# Patient Record
Sex: MALE | Race: WHITE | Employment: FULL TIME | URBAN - METROPOLITAN AREA
[De-identification: names, ages, dates, MRNs, and addresses within clinical notes are randomized per-mention and may not be internally consistent; named-entity substitution may affect disease eponyms.]

---

## 2018-06-18 ENCOUNTER — OFFICE VISIT (OUTPATIENT)
Dept: FAMILY MEDICINE | Facility: CLINIC | Age: 43
End: 2018-06-18
Payer: COMMERCIAL

## 2018-06-18 VITALS
HEART RATE: 79 BPM | OXYGEN SATURATION: 98 % | SYSTOLIC BLOOD PRESSURE: 126 MMHG | RESPIRATION RATE: 15 BRPM | DIASTOLIC BLOOD PRESSURE: 74 MMHG | BODY MASS INDEX: 28.34 KG/M2 | HEIGHT: 66 IN | TEMPERATURE: 98.2 F | WEIGHT: 176.38 LBS

## 2018-06-18 DIAGNOSIS — Z13.6 CARDIOVASCULAR SCREENING; LDL GOAL LESS THAN 160: ICD-10-CM

## 2018-06-18 DIAGNOSIS — Z00.00 ROUTINE GENERAL MEDICAL EXAMINATION AT A HEALTH CARE FACILITY: Primary | ICD-10-CM

## 2018-06-18 PROCEDURE — 99396 PREV VISIT EST AGE 40-64: CPT | Performed by: FAMILY MEDICINE

## 2018-06-18 ASSESSMENT — PAIN SCALES - GENERAL: PAINLEVEL: NO PAIN (0)

## 2018-06-18 NOTE — MR AVS SNAPSHOT
After Visit Summary   6/18/2018    Abdi Mckeon    MRN: [id number]           Patient Information     Date Of Birth          1975        Visit Information        Provider Department      6/18/2018 5:30 PM Varun Salazar MD [facility]        Today's Diagnoses     Routine general medical examination at a health care facility    -  1    CARDIOVASCULAR SCREENING; LDL GOAL LESS THAN 160          Care Instructions      Preventive Health Recommendations  Male Ages 40 to 49    Yearly exam:             See your health care provider every year in order to  o   Review health changes.   o   Discuss preventive care.    o   Review your medicines if your doctor has prescribed any.    You should be tested each year for STDs (sexually transmitted diseases) if you re at risk.     Have a cholesterol test every 5 years.     Have a colonoscopy (test for colon cancer) if someone in your family has had colon cancer or polyps before age 50.     After age 45, have a diabetes test (fasting glucose). If you are at risk for diabetes, you should have this test every 3 years.      Talk with your health care provider about whether or not a prostate cancer screening test (PSA) is right for you.    Shots: Get a flu shot each year. Get a tetanus shot every 10 years.     Nutrition:    Eat at least 5 servings of fruits and vegetables daily.     Eat whole-grain bread, whole-wheat pasta and brown rice instead of white grains and rice.     Talk to your provider about Calcium and Vitamin D.     Lifestyle    Exercise for at least 150 minutes a week (30 minutes a day, 5 days a week). This will help you control your weight and prevent disease.     Limit alcohol to one drink per day.     No smoking.     Wear sunscreen to prevent skin cancer.     See your dentist every six months for an exam and cleaning.              Follow-ups after your visit        Future tests that were ordered for you today     Open Future Orders   "      Priority Expected Expires Ordered    Lipid panel reflex to direct LDL Fasting Routine 2018    **Glucose FUTURE 2mo Routine 2018 10/16/2018 2018    **HIV Antigen Antibody Combo FUTURE anytime Routine 2018            Who to contact     If you have questions or need follow up information about today's clinic visit or your schedule please contact [facility] directly at [phone].  Normal or non-critical lab and imaging results will be communicated to you by [employer], letter or phone within 4 business days after the clinic has received the results. If you do not hear from us within 7 days, please contact the clinic through [employer] or phone. If you have a critical or abnormal lab result, we will notify you by phone as soon as possible.  Submit refill requests through [employer] or call your pharmacy and they will forward the refill request to us. Please allow 3 business days for your refill to be completed.          Additional Information About Your Visit        [employer] Information     [employer] lets you send messages to your doctor, view your test results, renew your prescriptions, schedule appointments and more. To sign up, go to [email or web address] . Click on \"Log in\" on the left side of the screen, which will take you to the Welcome page. Then click on \"Sign up Now\" on the right side of the page.     You will be asked to enter the access code listed below, as well as some personal information. Please follow the directions to create your username and password.     Your access code is: [email or web address]  Expires: 2018  7:05 PM     Your access code will  in 90 days. If you need help or a new code, please call your [place] clinic or [phone].        Care EveryWhere ID     This is your Care EveryWhere ID. This could be used by other organizations to access your [facility] medical records  [id number]        Your Vitals Were     " "Pulse Temperature Respirations Height Pulse Oximetry BMI (Body Mass Index)    79 98.2  F (36.8  C) (Tympanic) 15 5' 6.1\" (1.679 m) 98% 28.38 kg/m2       Blood Pressure from Last 3 Encounters:   06/18/18 126/74   03/11/16 124/86   12/06/14 106/70    Weight from Last 3 Encounters:   06/18/18 176 lb 6 oz (80 kg)   03/11/16 172 lb 6.4 oz (78.2 kg)   12/06/14 164 lb (74.4 kg)               Primary Care Provider Office Phone # Fax #    Varun Salazar [phone] [address] [place] [address]        Equal Access to Services     BERNARD ZHENG : Luiz valleso Solucindaali, waaxda luqadaha, qaybta kaalmada adeegyada, erica leyva. So [facility] [phone].    ATENCIÓN: Si habla español, tiene a newton disposición servicios gratuitos de asistencia lingüística. Llame al [phone].    We comply with applicable federal civil rights laws and [place] laws. We do not discriminate on the basis of race, color, national origin, age, disability, sex, sexual orientation, or gender identity.            Thank you!     Thank you for choosing [facility]  for your care. Our goal is always to provide you with excellent care. Hearing back from our patients is one way we can continue to improve our services. Please take a few minutes to complete the written survey that you may receive in the mail after your visit with us. Thank you!             Your Updated Medication List - Protect others around you: Learn how to safely use, store and throw away your medicines at www.disposemymeds.org.      Notice  As of 6/18/2018  7:46 PM    You have not been prescribed any medications.      "

## 2018-06-18 NOTE — PROGRESS NOTES
SUBJECTIVE:   CC: Abdi Mckeon is an 42 year old male who presents for preventative health visit.     Physical   Annual:     Getting at least 3 servings of Calcium per day::  Yes    Bi-annual eye exam::  Yes    Dental care twice a year::  NO    Sleep apnea or symptoms of sleep apnea::  None    Diet::  Regular (no restrictions)    Frequency of exercise::  None    Taking medications regularly::  Yes    Medication side effects::  None    Additional concerns today::  No                    Today's PHQ-2 Score:   PHQ-2 ( 1999 Pfizer) 6/18/2018   Q1: Little interest or pleasure in doing things 0   Q2: Feeling down, depressed or hopeless 0   PHQ-2 Score 0   Q1: Little interest or pleasure in doing things Not at all   Q2: Feeling down, depressed or hopeless Not at all   PHQ-2 Score 0       Abuse: Current or Past(Physical, Sexual or Emotional)- No  Do you feel safe in your environment - Yes    Social History   Substance Use Topics     Smoking status: Never Smoker     Smokeless tobacco: Never Used     Alcohol use No     Alcohol Use 6/18/2018   If you drink alcohol do you typically have greater than 3 drinks per day OR greater than 7 drinks per week? No       Last PSA: No results found for: PSA    Reviewed orders with patient. Reviewed health maintenance and updated orders accordingly - Yes  BP Readings from Last 3 Encounters:   06/18/18 126/74   03/11/16 124/86   12/06/14 106/70    Wt Readings from Last 3 Encounters:   06/18/18 176 lb 6 oz (80 kg)   03/11/16 172 lb 6.4 oz (78.2 kg)   12/06/14 164 lb (74.4 kg)                  Patient Active Problem List   Diagnosis     CARDIOVASCULAR SCREENING; LDL GOAL LESS THAN 160     No past surgical history on file.    Social History   Substance Use Topics     Smoking status: Never Smoker     Smokeless tobacco: Never Used     Alcohol use Yes      Comment: rare     Family History   Problem Relation Age of Onset     Family History Negative Mother      Family History Negative Father       "Hyperlipidemia Father      CEREBROVASCULAR DISEASE Maternal Grandmother      Prostate Cancer Paternal Grandfather      DIABETES No family hx of      Coronary Artery Disease No family hx of      Hypertension No family hx of          No current outpatient prescriptions on file.       Reviewed and updated as needed this visit by clinical staff         Reviewed and updated as needed this visit by Provider            Review of Systems  CONSTITUTIONAL: NEGATIVE for fever, chills, change in weight  INTEGUMENTARY/SKIN: NEGATIVE for worrisome rashes, moles or lesions  EYES: NEGATIVE for vision changes or irritation  ENT: NEGATIVE for ear, mouth and throat problems  RESP: NEGATIVE for significant cough or SOB  CV: NEGATIVE for chest pain, palpitations or peripheral edema  GI: NEGATIVE for nausea, abdominal pain, heartburn, or change in bowel habits   male: negative for dysuria, hematuria, decreased urinary stream, erectile dysfunction, urethral discharge  MUSCULOSKELETAL: NEGATIVE for significant arthralgias or myalgia  NEURO: NEGATIVE for weakness, dizziness or paresthesias  PSYCHIATRIC: NEGATIVE for changes in mood or affect    OBJECTIVE:   /74  Pulse 79  Temp 98.2  F (36.8  C) (Tympanic)  Resp 15  Ht 5' 6.1\" (1.679 m)  Wt 176 lb 6 oz (80 kg)  SpO2 98%  BMI 28.38 kg/m2    Physical Exam  GENERAL: healthy, alert and no distress  NECK: no adenopathy, no asymmetry, masses, or scars and thyroid normal to palpation  RESP: lungs clear to auscultation - no rales, rhonchi or wheezes  CV: regular rate and rhythm, normal S1 S2, no S3 or S4, no murmur, click or rub, no peripheral edema and peripheral pulses strong  ABDOMEN: soft, nontender, no hepatosplenomegaly, no masses and bowel sounds normal  MS: no gross musculoskeletal defects noted, no edema    ASSESSMENT/PLAN:   1. Routine general medical examination at a health care facility    - **Glucose FUTURE 2mo; Future  - **HIV Antigen Antibody Combo FUTURE anytime; " "Future    2. CARDIOVASCULAR SCREENING; LDL GOAL LESS THAN 160    - Lipid panel reflex to direct LDL Fasting; Future    COUNSELING:   Reviewed preventive health counseling, as reflected in patient instructions       Regular exercise       Healthy diet/nutrition         reports that he has never smoked. He has never used smokeless tobacco.    Estimated body mass index is 28.38 kg/(m^2) as calculated from the following:    Height as of this encounter: 5' 6.1\" (1.679 m).    Weight as of this encounter: 176 lb 6 oz (80 kg).   Weight management plan: Discussed healthy diet and exercise guidelines and patient will follow up in 12 months in clinic to re-evaluate.    Counseling Resources:  ATP IV Guidelines  Pooled Cohorts Equation Calculator  FRAX Risk Assessment  ICSI Preventive Guidelines  Dietary Guidelines for Americans, 2010  USDA's MyPlate  ASA Prophylaxis  Lung CA Screening    Varun Salazar MD, MD  [facility]  Answers for HPI/ROS submitted by the patient on 6/18/2018   PHQ-2 Score: 0    "

## 2018-07-07 DIAGNOSIS — Z00.00 ROUTINE GENERAL MEDICAL EXAMINATION AT A HEALTH CARE FACILITY: ICD-10-CM

## 2018-07-07 DIAGNOSIS — Z13.6 CARDIOVASCULAR SCREENING; LDL GOAL LESS THAN 160: ICD-10-CM

## 2018-07-07 LAB
CHOLEST SERPL-MCNC: 171 MG/DL
GLUCOSE SERPL-MCNC: 95 MG/DL (ref 70–99)
HDLC SERPL-MCNC: 34 MG/DL
LDLC SERPL CALC-MCNC: 99 MG/DL
NONHDLC SERPL-MCNC: 137 MG/DL
TRIGL SERPL-MCNC: 188 MG/DL

## 2018-07-07 PROCEDURE — 82947 ASSAY GLUCOSE BLOOD QUANT: CPT | Performed by: FAMILY MEDICINE

## 2018-07-07 PROCEDURE — 87389 HIV-1 AG W/HIV-1&-2 AB AG IA: CPT | Performed by: FAMILY MEDICINE

## 2018-07-07 PROCEDURE — 36415 COLL VENOUS BLD VENIPUNCTURE: CPT | Performed by: FAMILY MEDICINE

## 2018-07-07 PROCEDURE — 80061 LIPID PANEL: CPT | Performed by: FAMILY MEDICINE

## 2018-07-09 ENCOUNTER — TELEPHONE (OUTPATIENT)
Dept: FAMILY MEDICINE | Facility: CLINIC | Age: 43
End: 2018-07-09

## 2018-07-09 LAB — HIV 1+2 AB+HIV1 P24 AG SERPL QL IA: NONREACTIVE

## 2018-07-09 NOTE — TELEPHONE ENCOUNTER
----- Message from Varun Salazar MD sent at 7/8/2018  9:51 AM CDT -----  Please send this pt. a letter with result values for their home medical file.'  All looks good my friend   Have a great summer and fall hunting season   Doc

## 2018-07-09 NOTE — LETTER
July 9, 2018      Abdi Mckeon  [address]  [place] [facility] [address]        Dear ,    We are writing to inform you of your test results.    All looks good my friend   Have a great summer and fall hunting season   Doc     Resulted Orders   Lipid panel reflex to direct LDL Fasting   Result Value Ref Range    Cholesterol 171 <200 mg/dL    Triglycerides 188 (H) <150 mg/dL      Comment:      Borderline high:  150-199 mg/dl  High:             200-499 mg/dl  Very high:       >499 mg/dl  Fasting specimen      HDL Cholesterol 34 (L) >39 mg/dL    LDL Cholesterol Calculated 99 <100 mg/dL      Comment:      Desirable:       <100 mg/dl    Non HDL Cholesterol 137 (H) <130 mg/dL      Comment:      Above Desirable:  130-159 mg/dl  Borderline high:  160-189 mg/dl  High:             190-219 mg/dl  Very high:       >219 mg/dl     **Glucose FUTURE 2mo   Result Value Ref Range    Glucose 95 70 - 99 mg/dL      Comment:      Fasting specimen       If you have any questions or concerns, please call the clinic at the number listed above.       Sincerely,        Varun Salazar MD, MD

## 2019-11-04 ENCOUNTER — OFFICE VISIT (OUTPATIENT)
Dept: FAMILY MEDICINE | Facility: CLINIC | Age: 44
End: 2019-11-04
Payer: COMMERCIAL

## 2019-11-04 VITALS
RESPIRATION RATE: 14 BRPM | TEMPERATURE: 97 F | HEIGHT: 66 IN | OXYGEN SATURATION: 97 % | DIASTOLIC BLOOD PRESSURE: 72 MMHG | WEIGHT: 172.5 LBS | SYSTOLIC BLOOD PRESSURE: 124 MMHG | HEART RATE: 62 BPM | BODY MASS INDEX: 27.72 KG/M2

## 2019-11-04 DIAGNOSIS — Z00.00 ROUTINE GENERAL MEDICAL EXAMINATION AT A HEALTH CARE FACILITY: Primary | ICD-10-CM

## 2019-11-04 DIAGNOSIS — D22.9 ATYPICAL MOLE: ICD-10-CM

## 2019-11-04 DIAGNOSIS — Z23 NEED FOR VACCINATION: ICD-10-CM

## 2019-11-04 DIAGNOSIS — S06.0X0A CONCUSSION WITHOUT LOSS OF CONSCIOUSNESS, INITIAL ENCOUNTER: ICD-10-CM

## 2019-11-04 PROCEDURE — 90471 IMMUNIZATION ADMIN: CPT | Performed by: FAMILY MEDICINE

## 2019-11-04 PROCEDURE — 99396 PREV VISIT EST AGE 40-64: CPT | Mod: 25 | Performed by: FAMILY MEDICINE

## 2019-11-04 PROCEDURE — 90715 TDAP VACCINE 7 YRS/> IM: CPT | Performed by: FAMILY MEDICINE

## 2019-11-04 PROCEDURE — 88342 IMHCHEM/IMCYTCHM 1ST ANTB: CPT | Mod: TC | Performed by: FAMILY MEDICINE

## 2019-11-04 PROCEDURE — 88305 TISSUE EXAM BY PATHOLOGIST: CPT | Mod: TC | Performed by: FAMILY MEDICINE

## 2019-11-04 PROCEDURE — 11104 PUNCH BX SKIN SINGLE LESION: CPT | Performed by: FAMILY MEDICINE

## 2019-11-04 ASSESSMENT — ENCOUNTER SYMPTOMS
MYALGIAS: 0
FREQUENCY: 0
SORE THROAT: 0
PALPITATIONS: 0
COUGH: 0
FEVER: 0
JOINT SWELLING: 0
ABDOMINAL PAIN: 0
EYE PAIN: 0
CONSTIPATION: 0
HEADACHES: 0
HEMATOCHEZIA: 0
HEARTBURN: 0
NERVOUS/ANXIOUS: 0
DIZZINESS: 1
DYSURIA: 0
WEAKNESS: 0
ARTHRALGIAS: 0
HEMATURIA: 0
DIARRHEA: 0
CHILLS: 0
SHORTNESS OF BREATH: 0
PARESTHESIAS: 0
NAUSEA: 0

## 2019-11-04 ASSESSMENT — MIFFLIN-ST. JEOR: SCORE: 1620.2

## 2019-11-04 ASSESSMENT — PAIN SCALES - GENERAL: PAINLEVEL: NO PAIN (0)

## 2019-11-04 NOTE — PROGRESS NOTES
SUBJECTIVE:   CC: Abdi Mckeon is an 43 year old male who presents for preventative health visit.     Healthy Habits:     Getting at least 3 servings of Calcium per day:  Yes    Bi-annual eye exam:  Yes    Dental care twice a year:  Yes    Sleep apnea or symptoms of sleep apnea:  None    Diet:  Regular (no restrictions)    Frequency of exercise:  None    Taking medications regularly:  Yes    Medication side effects:  None    PHQ-2 Total Score: 0    Additional concerns today:  No              Today's PHQ-2 Score:   PHQ-2 ( 1999 Pfizer) 11/4/2019   Q1: Little interest or pleasure in doing things 0   Q2: Feeling down, depressed or hopeless 0   PHQ-2 Score 0   Q1: Little interest or pleasure in doing things Not at all   Q2: Feeling down, depressed or hopeless Not at all   PHQ-2 Score 0       Abuse: Current or Past(Physical, Sexual or Emotional)- No  Do you feel safe in your environment? Yes        Social History     Tobacco Use     Smoking status: Never Smoker     Smokeless tobacco: Never Used   Substance Use Topics     Alcohol use: Yes     Comment: rare         Alcohol Use 11/4/2019   Prescreen: >3 drinks/day or >7 drinks/week? No       Last PSA: No results found for: PSA    Reviewed orders with patient. Reviewed health maintenance and updated orders accordingly - Yes  BP Readings from Last 3 Encounters:   11/04/19 124/72   06/18/18 126/74   03/11/16 124/86    Wt Readings from Last 3 Encounters:   11/04/19 78.2 kg (172 lb 8 oz)   06/18/18 80 kg (176 lb 6 oz)   03/11/16 78.2 kg (172 lb 6.4 oz)                  Patient Active Problem List   Diagnosis     CARDIOVASCULAR SCREENING; LDL GOAL LESS THAN 160     History reviewed. No pertinent surgical history.    Social History     Tobacco Use     Smoking status: Never Smoker     Smokeless tobacco: Never Used   Substance Use Topics     Alcohol use: Yes     Comment: rare     Family History   Problem Relation Age of Onset     Family History Negative Mother      Family History  Negative Father      Hyperlipidemia Father      Cerebrovascular Disease Maternal Grandmother      Prostate Cancer Paternal Grandfather      Diabetes No family hx of      Coronary Artery Disease No family hx of      Hypertension No family hx of            Reviewed and updated as needed this visit by clinical staff  Tobacco  Allergies  Meds  Med Hx  Surg Hx  Fam Hx  Soc Hx        Reviewed and updated as needed this visit by Provider            Review of Systems   Constitutional: Negative for chills and fever.   HENT: Negative for congestion, ear pain, hearing loss and sore throat.    Eyes: Negative for pain and visual disturbance.   Respiratory: Negative for cough and shortness of breath.    Cardiovascular: Negative for chest pain, palpitations and peripheral edema.   Gastrointestinal: Negative for abdominal pain, constipation, diarrhea, heartburn, hematochezia and nausea.   Genitourinary: Negative for discharge, dysuria, frequency, genital sores, hematuria, impotence and urgency.   Musculoskeletal: Negative for arthralgias, joint swelling and myalgias.   Skin: Negative for rash.   Neurological: Positive for dizziness. Negative for weakness, headaches and paresthesias.   Psychiatric/Behavioral: Negative for mood changes. The patient is not nervous/anxious.      CONSTITUTIONAL: NEGATIVE for fever, chills, change in weight  INTEGUMENTARY/SKIN: Left side of his forehead which is changed gotten larger over the past 3 weeks he would like it removed and sent him to make sure it is not skin cancer.  No other complaints concerns at this time.  EYES: NEGATIVE for vision changes or irritation  ENT: NEGATIVE for ear, mouth and throat problems  RESP: NEGATIVE for significant cough or SOB  CV: NEGATIVE for chest pain, palpitations or peripheral edema  GI: NEGATIVE for nausea, abdominal pain, heartburn, or change in bowel habits   male: negative for dysuria, hematuria, decreased urinary stream, erectile dysfunction,  "urethral discharge  MUSCULOSKELETAL: NEGATIVE for significant arthralgias or myalgia  NEURO: Patient states over 2 weeks ago he fell off a ladder about 3 to 4 feet and struck his head when he landed.  States he did not feel bad first few days other than whiplash neck pain.  He states that over the past week though he is noticed some vertigo type symptoms when he rolls over in bed quickly-hit the snooze on his alarm.  Or he turns his head quickly is a little vertiginous symptom.  He denies any headache.  No other neuro complaints at this time I did have a long discussion with him about postconcussive syndrome.  PSYCHIATRIC: NEGATIVE for changes in mood or affect    OBJECTIVE:   /72   Pulse 62   Temp 97  F (36.1  C) (Tympanic)   Resp 14   Ht 1.676 m (5' 6\")   Wt 78.2 kg (172 lb 8 oz)   SpO2 97%   BMI 27.84 kg/m      Physical Exam  GENERAL: healthy, alert and no distress  EYES: Eyes grossly normal to inspection, PERRL and conjunctivae and sclerae normal  HENT: ear canals and TM's normal, nose and mouth without ulcers or lesions  NECK: no adenopathy, no asymmetry, masses, or scars and thyroid normal to palpation  RESP: lungs clear to auscultation - no rales, rhonchi or wheezes  CV: regular rate and rhythm, normal S1 S2, no S3 or S4, no murmur, click or rub, no peripheral edema and peripheral pulses strong  ABDOMEN: soft, nontender, no hepatosplenomegaly, no masses and bowel sounds normal  MS: no gross musculoskeletal defects noted, no edema  SKIN: Patient has a 0.2 x 0.2 cm mole over the left side of his forehead.  NEURO: Normal strength and tone, sensory exam grossly normal, mentation intact, cranial nerves 2-12 intact, DTR's normal and symmetric, gait normal and Romberg normal  PSYCH: mentation appears normal, affect normal/bright    Informed consent was obtained the area over the mole was prepped with alcohol anesthetized with 1% lidocaine with epinephrine and the mole was removed with a #2 punch.  The " "base was hyfrecated.  Sterile dressing was applied.    ASSESSMENT/PLAN:   1. Routine general medical examination at a health care facility      2. Need for vaccination    - TDAP VACCINE (ADACEL) [38885.002]  - 1st  Administration  [33201]  - SCREENING QUESTIONS FOR ADULT IMMUNIZATIONS    3. Atypical mole    - Dermatological path order and indications    4.  Concussion without loss of consciousness  I did tell the patient postconcussive symptoms can last for long time he should have taken at least a week off work once the injury happened letter is brain rest but now a little too late for that.  I will follow him closely and his neurologic progress.  Can take meclizine for the vertiginous symptoms but these should resolve over the next 2 to 3 weeks if they persist I would like to get him evaluated by a neurologist.  COUNSELING:   Reviewed preventive health counseling, as reflected in patient instructions       Regular exercise       Healthy diet/nutrition    Estimated body mass index is 27.84 kg/m  as calculated from the following:    Height as of this encounter: 1.676 m (5' 6\").    Weight as of this encounter: 78.2 kg (172 lb 8 oz).     Weight management plan: Discussed healthy diet and exercise guidelines     reports that he has never smoked. He has never used smokeless tobacco.      Counseling Resources:  ATP IV Guidelines  Pooled Cohorts Equation Calculator  FRAX Risk Assessment  ICSI Preventive Guidelines  Dietary Guidelines for Americans, 2010  USDA's MyPlate  ASA Prophylaxis  Lung CA Screening    Varun Salazar MD, MD  [facility]  "

## 2019-11-04 NOTE — LETTER
"November 11, 2019      Abdi Bryant  [address]  [place] [facility] [address]        Dear ,    We are writing to inform you of your test results.    The lesion I took off your forehead was a seborrheic keratosis which is just a normal skin lesion.  It will never turn into cancer or anything that should be concerning, continue to use sunscreen and avoid sunburn.     Good luck deer hunting, like to get a bird hunting next fall.    Resulted Orders   Dermatological path order and indications   Result Value Ref Range    Copath Report       Patient Name: ABDI BRYANT  MR#: [id number]  Specimen #: [id number]  Collected: 11/4/2019  Received: 11/5/2019  Reported: 11/7/2019 19:57  Ordering Phy(s): GRADY MATA    For improved result formatting, select 'View Enhanced Report Format' under   Linked Documents section.    SPECIMEN(S):  Punch biopsy, left forehead    FINAL DIAGNOSIS:  Skin, left forehead:  - Seborrheic keratosis, pigmented - (see description)    I have personally reviewed all specimens and/or slides, including the   listed special stains, and used them  with my medical judgement to determine or confirm the final diagnosis.    Electronically signed out by:    Roger Meeks M.D., PhD, [facility]    CLINICAL HISTORY:  The patient is a 43-year-old male    GROSS:  The specimen is received in formalin with proper patient identification,   labeled \"left forehead\".  The  specimen consists of a 0.1 cm in diameter skin punch biopsy excised to a   depth of 0.1 cm.  The entire skin  surface is covered by a 0.1 x 0. 1 x 0.1 cm brown raised lesion.  The   resection margin is inked blue.  The  specimen is entirely submitted in cassette A 1. (Dictated by: Oral Graves   11/5/2019 01:42 PM)    MICROSCOPIC:  The specimen exhibits compact orthokeratosis, papillomatous epidermal   hyperplasia with horn cyst formation and  increased keratinocyte pigmentation. Melan A shows a normal number and   distribution of basal " melanocytes.    The technical component of this testing was completed at the [facility], with the professional component performed   at the [facility], [address] [place] [id number] ([phone])    CPT Codes:  A: 64519-KR2.T, 35826-XO5.P, 31086-TKPKW    COLLECTION SITE:  Client: [facility]  Location: [facility] (P)           If you have any questions or concerns, please call the clinic at the number listed above.       Sincerely,        Varun Salazar MD, MD

## 2019-11-07 LAB — COPATH REPORT: NORMAL

## 2020-10-05 ENCOUNTER — OFFICE VISIT (OUTPATIENT)
Dept: PODIATRY | Facility: CLINIC | Age: 45
End: 2020-10-05
Payer: COMMERCIAL

## 2020-10-05 ENCOUNTER — ANCILLARY PROCEDURE (OUTPATIENT)
Dept: GENERAL RADIOLOGY | Facility: CLINIC | Age: 45
End: 2020-10-05
Attending: PODIATRIST
Payer: COMMERCIAL

## 2020-10-05 VITALS
HEIGHT: 66 IN | SYSTOLIC BLOOD PRESSURE: 118 MMHG | BODY MASS INDEX: 27.97 KG/M2 | DIASTOLIC BLOOD PRESSURE: 72 MMHG | WEIGHT: 174 LBS

## 2020-10-05 DIAGNOSIS — M72.2 PLANTAR FASCIITIS: ICD-10-CM

## 2020-10-05 DIAGNOSIS — M72.2 PLANTAR FASCIITIS: Primary | ICD-10-CM

## 2020-10-05 PROCEDURE — 73630 X-RAY EXAM OF FOOT: CPT | Mod: RT | Performed by: RADIOLOGY

## 2020-10-05 PROCEDURE — 99204 OFFICE O/P NEW MOD 45 MIN: CPT | Performed by: PODIATRIST

## 2020-10-05 RX ORDER — DICLOFENAC SODIUM 75 MG/1
75 TABLET, DELAYED RELEASE ORAL 2 TIMES DAILY
Qty: 60 TABLET | Refills: 1 | Status: SHIPPED | OUTPATIENT
Start: 2020-10-05 | End: 2021-11-26

## 2020-10-05 ASSESSMENT — MIFFLIN-ST. JEOR: SCORE: 1625.82

## 2020-10-05 ASSESSMENT — PAIN SCALES - GENERAL: PAINLEVEL: MILD PAIN (2)

## 2020-10-05 NOTE — PROGRESS NOTES
HPI:  Abdi Mckeon is a 44 year old male who is seen in consultation at the request of self    Pt presents for eval of:   (Onset, Location, L/R, Character, Treatments, Injury if yes)    XR Left and Right foot 10/5/2020     Onset July 2020, plantar Left and Right heel pain > Right. No injury noted. Presents today with work boots. 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    Weight management plan: Patient was referred to their PCP to discuss a diet and exercise plan.       ROS: 10 point ROS neg other than the symptoms noted above in the HPI.    Patient Active Problem List   Diagnosis     CARDIOVASCULAR SCREENING; LDL GOAL LESS THAN 160       PAST MEDICAL HISTORY: History reviewed. No pertinent past medical history.  PAST SURGICAL HISTORY: History reviewed. No pertinent surgical history.  MEDICATIONS:   Current Outpatient Medications:      diclofenac (VOLTAREN) 75 MG EC tablet, Take 1 tablet (75 mg) by mouth 2 times daily, Disp: 60 tablet, Rfl: 1  ALLERGIES:    Allergies   Allergen Reactions     No Known Drug Allergies      SOCIAL HISTORY:   Social History     Socioeconomic History     Marital status:      Spouse name: Not on file     Number of children: Not on file     Years of education: Not on file     Highest education level: Not on file   Occupational History     Not on file   Social Needs     Financial resource strain: Not on file     Food insecurity     Worry: Not on file     Inability: Not on file     Transportation needs     Medical: Not on file     Non-medical: Not on file   Tobacco Use     Smoking status: Never Smoker     Smokeless tobacco: Never Used   Substance and Sexual Activity     Alcohol use: Yes     Comment: rare     Drug use: No     Sexual activity: Yes     Partners: Female     Birth control/protection: Pill   Lifestyle     Physical activity     Days per week: Not on file     Minutes per session: Not on file     Stress: Not on file   Relationships     Social  "connections     Talks on phone: Not on file     Gets together: Not on file     Attends Gnosticist service: Not on file     Active member of club or organization: Not on file     Attends meetings of clubs or organizations: Not on file     Relationship status: Not on file     Intimate partner violence     Fear of current or ex partner: Not on file     Emotionally abused: Not on file     Physically abused: Not on file     Forced sexual activity: Not on file   Other Topics Concern     Parent/sibling w/ CABG, MI or angioplasty before 65F 55M? Not Asked   Social History Narrative     Not on file     FAMILY HISTORY:   Family History   Problem Relation Age of Onset     Family History Negative Mother      Family History Negative Father      Hyperlipidemia Father      Cerebrovascular Disease Maternal Grandmother      Prostate Cancer Paternal Grandfather      Diabetes No family hx of      Coronary Artery Disease No family hx of      Hypertension No family hx of        EXAM:Vitals: /72 (BP Location: Left arm, Patient Position: Sitting, Cuff Size: Adult Regular)   Ht 1.682 m (5' 6.24\")   Wt 78.9 kg (174 lb)   BMI 27.88 kg/m    BMI= Body mass index is 27.88 kg/m .    General appearance: Patient is alert and fully cooperative with history & exam.  No sign of distress is noted during the visit.     Psychiatric: Affect is pleasant & appropriate.  Patient appears motivated to improve health.     Respiratory: Breathing is regular & unlabored while sitting.     HEENT: Hearing is intact to spoken word.  Speech is clear.  No gross evidence of visual impairment that would impact ambulation.     Vascular: DP & PT 2/4 & regular bilaterally.  No significant edema, rubor or varicosities noted.  CFT and skin temperature is normal to both lower extremities.       Neurologic: Lower extremity sensation is intact to light touch.  No evidence of weakness in the lower extremities.  No evidence of neuropathy and negative tinel sign.   "   Dermatologic: Skin is intact to both lower extremities without significant lesions, rash or abrasion.  Normal texture turgor and tone. No paronychia or evidence of soft tissue infection is noted.    Musculoskeletal: Patient is ambulatory without assistive device or brace. Pain is noted with firm palpation along the medial band of the plantar fascia right foot most notably at the origination upon the calcaneus not through the arch.  No pain with compression of the calcaneus medial to lateral or with palpation of the achilles, peroneal or posterior tibial tendons.  Slightly more than 0  of ankle joint dorsiflexion without crepitus or pain throughout the ankle, subtalar or midtarsal joints.  No pain or limitations throughout manual muscle strength testing plus 5/5 to all four quadrants bilateral.  No palpable edema noted.      Radiographs:  Osteophyte noted about the plantar calcaneus consistent with plantar fasciitis.     ASSESSMENT:       ICD-10-CM    1. Plantar fasciitis  M72.2 XR Foot Bilateral G/E 3 Views     ORTHOTICS REFERRAL     diclofenac (VOLTAREN) 75 MG EC tablet       PLAN:  Reviewed patient's chart in [facility] and discussed etiology and treatment options.      Treatments:  10/5/2020  Obtained and interpreted radiographs.   Discontinue barefoot walking or unsupported walking in shoes without shank.  Dispensed written instructions to obtain appropriate shoe gear and/or OTC inserts.  Dispensed anterior night splint to use all night every night.  Prescription oral Voltaren for a short course. Discussed risks.  Prescription for custom molded orthotics 10/5/2020  Follow up in 4-5 weeks       Harris Smith DPM

## 2020-10-05 NOTE — PATIENT INSTRUCTIONS
Reliable shoe stores: To maximize your experience and provide the best possible fit.  Be sure to show them your foot concerns and tell them Dr. Smith sent you.      Stores listed in bold have only athletic shoes, and stores that are not bold are mostly casual or variety of shoes    [employer]  [address] [place] [address]  [phone]    [employer] [place]  [address] [place] [address]  [phone]     [employer]  [address] [place] [address]  [phone]    [employer]  [address] [facility]  [facility] [address]  [phone]    [employer]  [address] [place], [place] [address] [phone]    [employer]  [address] [place] [address]  [phone]                         [employer] Locations:     [address] [place] [address]   [phone]     [address] [place] [address] [address] [place] [address]   [phone]     [address] [place] [address]   [phone]     [address] [facility] [place] [address]   [phone]     [address] [facility] [place] [address]   [phone]     [address] [place] [address]   [phone]     [address] [facility] [address]   [place] [address]   [phone]     [address] [facility]   [place] [address]   [phone]             How to find reasonable shoes          The correct width    Correct Fitting    Correct Length      Foot Distortion    Posture Distortion                          Torsional Rigidity      Grasp behind the heel and underneath the foot and twist      Bad    Excessive torsion/twist in midfoot     Less torsion/twist in midfoot is better                   Heel Counter Rigidity      Grasp just above   midsole and squeeze      Bad    Soft heel counter      Good    Rigid Heel Counter      Flexion Rigidity      Grasp shoe and bend from forefoot to rearfoot                  PLANTAR FASCIITIS  The  plantar fascia  is a  thick fibrous layer of tissue that covers the bones on the bottom of your foot. It supports the foot bones in an arched position.  Plantar fasciitis  is a painful inflammation of the plantar fascia due to overuse. This can develop gradually or suddenly. It usually affects one foot at a time but can affect both feet. Heel pain can be sharp and feel like a knife sticking in the bottom of your foot. Pain may occur after exercising, long distance jogging, stair climbing, long periods of standing, or after getting up from a seated position.  Risk factors include arthritis, diabetes, obesity or recent weight gain, flat-foot, high arch, wearing high heels or loose shoes or shoes with poor arch support.  Sudden changes in activity or shoe gear may contribute to symptoms.  Foot pain from this condition is usually worse in the morning and improves with walking. By the end of the day there may be a dull aching. Treatment requires improved support of feet, short-term rest and controlling inflammation. It may take up to nine months before all symptoms go away with the measures described below.  A steroid injection into the foot, or surgery may be needed if this is becomes long standing or severe.  HOME CARE  1. If you are overweight, lose weight to promote healing.  2. Choose supportive shoes (stiff through the shank) with good arch support and shock absorbency. Replace athletic shoes when they become worn out. Don t walk or run barefoot.  3. Shoe inserts are an important part of treatment. You can buy off-the-shelf shoe inserts inexpensively such as [employer].  The best ones are custom molded to your foot with a prescription.  4. Night splints keep the plantar fascia gently stretched while you sleep and will eliminate morning pain. Wear it ALL NIGHT EVERY NIGHT, or any time you sit for a long time.  5. Reduce by 10% or more the activities that stress the feet: jogging, prolonged standing or walking, high impact sports,  etc.  6. Stretch your feet. Gently flex your ankle by leaning into a wall or counter or drop your heel from a step.  Stretch two minutes of every hour you are awake.  7. Icing or massage may help heel pain. Apply an ice pack or frozen water or Coke bottle to the heel for 10-20 minutes as a preventive or after an acute flare of symptoms. You may repeat this as needed.   Follow up with your Doctor in 3 weeks as instructed.

## 2020-10-05 NOTE — LETTER
10/5/2020         RE: Abdi Mckeon  [address]  [facility] [address]        Dear Colleague,    Thank you for referring your patient, Abdi Mckeon, to the [facility]. Please see a copy of my visit note below.    HPI:  Abdi Mckeon is a 44 year old male who is seen in consultation at the request of self    Pt presents for eval of:   (Onset, Location, L/R, Character, Treatments, Injury if yes)    XR Left and Right foot 10/5/2020     Onset July 2020, plantar Left and Right heel pain > Right. No injury noted. Presents today with work boots. 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    Weight management plan: Patient was referred to their PCP to discuss a diet and exercise plan.       ROS: 10 point ROS neg other than the symptoms noted above in the HPI.    Patient Active Problem List   Diagnosis     CARDIOVASCULAR SCREENING; LDL GOAL LESS THAN 160       PAST MEDICAL HISTORY: History reviewed. No pertinent past medical history.  PAST SURGICAL HISTORY: History reviewed. No pertinent surgical history.  MEDICATIONS:   Current Outpatient Medications:      diclofenac (VOLTAREN) 75 MG EC tablet, Take 1 tablet (75 mg) by mouth 2 times daily, Disp: 60 tablet, Rfl: 1  ALLERGIES:    Allergies   Allergen Reactions     No Known Drug Allergies      SOCIAL HISTORY:   Social History     Socioeconomic History     Marital status:      Spouse name: Not on file     Number of children: Not on file     Years of education: Not on file     Highest education level: Not on file   Occupational History     Not on file   Social Needs     Financial resource strain: Not on file     Food insecurity     Worry: Not on file     Inability: Not on file     Transportation needs     Medical: Not on file     Non-medical: Not on file   Tobacco Use     Smoking status: Never Smoker     Smokeless tobacco: Never Used   Substance and Sexual Activity     Alcohol use: Yes     Comment: rare      "Drug use: No     Sexual activity: Yes     Partners: Female     Birth control/protection: Pill   Lifestyle     Physical activity     Days per week: Not on file     Minutes per session: Not on file     Stress: Not on file   Relationships     Social connections     Talks on phone: Not on file     Gets together: Not on file     Attends Pentecostal service: Not on file     Active member of club or organization: Not on file     Attends meetings of clubs or organizations: Not on file     Relationship status: Not on file     Intimate partner violence     Fear of current or ex partner: Not on file     Emotionally abused: Not on file     Physically abused: Not on file     Forced sexual activity: Not on file   Other Topics Concern     Parent/sibling w/ CABG, MI or angioplasty before 65F 55M? Not Asked   Social History Narrative     Not on file     FAMILY HISTORY:   Family History   Problem Relation Age of Onset     Family History Negative Mother      Family History Negative Father      Hyperlipidemia Father      Cerebrovascular Disease Maternal Grandmother      Prostate Cancer Paternal Grandfather      Diabetes No family hx of      Coronary Artery Disease No family hx of      Hypertension No family hx of        EXAM:Vitals: /72 (BP Location: Left arm, Patient Position: Sitting, Cuff Size: Adult Regular)   Ht 1.682 m (5' 6.24\")   Wt 78.9 kg (174 lb)   BMI 27.88 kg/m    BMI= Body mass index is 27.88 kg/m .    General appearance: Patient is alert and fully cooperative with history & exam.  No sign of distress is noted during the visit.     Psychiatric: Affect is pleasant & appropriate.  Patient appears motivated to improve health.     Respiratory: Breathing is regular & unlabored while sitting.     HEENT: Hearing is intact to spoken word.  Speech is clear.  No gross evidence of visual impairment that would impact ambulation.     Vascular: DP & PT 2/4 & regular bilaterally.  No significant edema, rubor or varicosities " noted.  CFT and skin temperature is normal to both lower extremities.       Neurologic: Lower extremity sensation is intact to light touch.  No evidence of weakness in the lower extremities.  No evidence of neuropathy and negative tinel sign.     Dermatologic: Skin is intact to both lower extremities without significant lesions, rash or abrasion.  Normal texture turgor and tone. No paronychia or evidence of soft tissue infection is noted.    Musculoskeletal: Patient is ambulatory without assistive device or brace. Pain is noted with firm palpation along the medial band of the plantar fascia right foot most notably at the origination upon the calcaneus not through the arch.  No pain with compression of the calcaneus medial to lateral or with palpation of the achilles, peroneal or posterior tibial tendons.  Slightly more than 0  of ankle joint dorsiflexion without crepitus or pain throughout the ankle, subtalar or midtarsal joints.  No pain or limitations throughout manual muscle strength testing plus 5/5 to all four quadrants bilateral.  No palpable edema noted.      Radiographs:  Osteophyte noted about the plantar calcaneus consistent with plantar fasciitis.     ASSESSMENT:       ICD-10-CM    1. Plantar fasciitis  M72.2 XR Foot Bilateral G/E 3 Views     ORTHOTICS REFERRAL     diclofenac (VOLTAREN) 75 MG EC tablet       PLAN:  Reviewed patient's chart in [facility] and discussed etiology and treatment options.      Treatments:  10/5/2020  Obtained and interpreted radiographs.   Discontinue barefoot walking or unsupported walking in shoes without shank.  Dispensed written instructions to obtain appropriate shoe gear and/or OTC inserts.  Dispensed anterior night splint to use all night every night.  Prescription oral Voltaren for a short course. Discussed risks.  Prescription for custom molded orthotics 10/5/2020  Follow up in 4-5 weeks       Harris Smith DPM        Again, thank you for allowing me to participate in the care  of your patient.        Sincerely,        Harris Smith DPM

## 2021-11-26 ENCOUNTER — OFFICE VISIT (OUTPATIENT)
Dept: FAMILY MEDICINE | Facility: CLINIC | Age: 46
End: 2021-11-26
Payer: COMMERCIAL

## 2021-11-26 VITALS
OXYGEN SATURATION: 97 % | DIASTOLIC BLOOD PRESSURE: 80 MMHG | BODY MASS INDEX: 26.83 KG/M2 | SYSTOLIC BLOOD PRESSURE: 112 MMHG | RESPIRATION RATE: 16 BRPM | TEMPERATURE: 97.3 F | WEIGHT: 177 LBS | HEIGHT: 68 IN | HEART RATE: 72 BPM

## 2021-11-26 DIAGNOSIS — Z00.00 ROUTINE GENERAL MEDICAL EXAMINATION AT A HEALTH CARE FACILITY: Primary | ICD-10-CM

## 2021-11-26 LAB
ALBUMIN SERPL-MCNC: 3.9 G/DL (ref 3.4–5)
ALP SERPL-CCNC: 69 U/L (ref 40–150)
ALT SERPL W P-5'-P-CCNC: 26 U/L (ref 0–70)
ANION GAP SERPL CALCULATED.3IONS-SCNC: 3 MMOL/L (ref 3–14)
AST SERPL W P-5'-P-CCNC: 10 U/L (ref 0–45)
BASOPHILS # BLD AUTO: 0 10E3/UL (ref 0–0.2)
BASOPHILS NFR BLD AUTO: 1 %
BILIRUB SERPL-MCNC: 0.6 MG/DL (ref 0.2–1.3)
BUN SERPL-MCNC: 10 MG/DL (ref 7–30)
CALCIUM SERPL-MCNC: 8.6 MG/DL (ref 8.5–10.1)
CHLORIDE BLD-SCNC: 111 MMOL/L (ref 94–109)
CO2 SERPL-SCNC: 26 MMOL/L (ref 20–32)
CREAT SERPL-MCNC: 0.88 MG/DL (ref 0.66–1.25)
EOSINOPHIL # BLD AUTO: 0.3 10E3/UL (ref 0–0.7)
EOSINOPHIL NFR BLD AUTO: 5 %
ERYTHROCYTE [DISTWIDTH] IN BLOOD BY AUTOMATED COUNT: 12.2 % (ref 10–15)
GFR SERPL CREATININE-BSD FRML MDRD: >90 ML/MIN/1.73M2
GLUCOSE BLD-MCNC: 105 MG/DL (ref 70–99)
HCT VFR BLD AUTO: 49.6 % (ref 40–53)
HCV AB SERPL QL IA: NONREACTIVE
HGB BLD-MCNC: 17.6 G/DL (ref 13.3–17.7)
IMM GRANULOCYTES # BLD: 0.1 10E3/UL
IMM GRANULOCYTES NFR BLD: 1 %
LYMPHOCYTES # BLD AUTO: 1.6 10E3/UL (ref 0.8–5.3)
LYMPHOCYTES NFR BLD AUTO: 25 %
MCH RBC QN AUTO: 29.8 PG (ref 26.5–33)
MCHC RBC AUTO-ENTMCNC: 35.5 G/DL (ref 31.5–36.5)
MCV RBC AUTO: 84 FL (ref 78–100)
MONOCYTES # BLD AUTO: 0.4 10E3/UL (ref 0–1.3)
MONOCYTES NFR BLD AUTO: 6 %
NEUTROPHILS # BLD AUTO: 4 10E3/UL (ref 1.6–8.3)
NEUTROPHILS NFR BLD AUTO: 62 %
NRBC # BLD AUTO: 0 10E3/UL
NRBC BLD AUTO-RTO: 0 /100
PLATELET # BLD AUTO: 208 10E3/UL (ref 150–450)
POTASSIUM BLD-SCNC: 4.3 MMOL/L (ref 3.4–5.3)
PROT SERPL-MCNC: 6.8 G/DL (ref 6.8–8.8)
RBC # BLD AUTO: 5.91 10E6/UL (ref 4.4–5.9)
SODIUM SERPL-SCNC: 140 MMOL/L (ref 133–144)
WBC # BLD AUTO: 6.4 10E3/UL (ref 4–11)

## 2021-11-26 PROCEDURE — 86769 SARS-COV-2 COVID-19 ANTIBODY: CPT | Mod: 90 | Performed by: PHYSICIAN ASSISTANT

## 2021-11-26 PROCEDURE — 99396 PREV VISIT EST AGE 40-64: CPT | Performed by: PHYSICIAN ASSISTANT

## 2021-11-26 PROCEDURE — 36415 COLL VENOUS BLD VENIPUNCTURE: CPT | Performed by: PHYSICIAN ASSISTANT

## 2021-11-26 PROCEDURE — 85025 COMPLETE CBC W/AUTO DIFF WBC: CPT | Performed by: PHYSICIAN ASSISTANT

## 2021-11-26 PROCEDURE — 99000 SPECIMEN HANDLING OFFICE-LAB: CPT | Performed by: PHYSICIAN ASSISTANT

## 2021-11-26 PROCEDURE — 86803 HEPATITIS C AB TEST: CPT | Performed by: PHYSICIAN ASSISTANT

## 2021-11-26 PROCEDURE — 80053 COMPREHEN METABOLIC PANEL: CPT | Performed by: PHYSICIAN ASSISTANT

## 2021-11-26 ASSESSMENT — ENCOUNTER SYMPTOMS
MYALGIAS: 0
PARESTHESIAS: 0
ARTHRALGIAS: 0
FREQUENCY: 0
JOINT SWELLING: 0
CHILLS: 0
FEVER: 0
ABDOMINAL PAIN: 0
NERVOUS/ANXIOUS: 0
DYSURIA: 0
SORE THROAT: 0
PALPITATIONS: 0
HEARTBURN: 0
WEAKNESS: 0
NAUSEA: 0
DIZZINESS: 0
COUGH: 0
HEMATOCHEZIA: 0
HEMATURIA: 0
SHORTNESS OF BREATH: 0
CONSTIPATION: 0
HEADACHES: 0
DIARRHEA: 0
EYE PAIN: 0

## 2021-11-26 ASSESSMENT — PAIN SCALES - GENERAL: PAINLEVEL: NO PAIN (0)

## 2021-11-26 ASSESSMENT — MIFFLIN-ST. JEOR: SCORE: 1657.37

## 2021-11-26 NOTE — LETTER
November 30, 2021      Abdi Mckeon  [address] [facility] [address]        Dear ,    We are writing to inform you of your test results.      The results of your lab work returned with no evidence of covid antibodies. The hepatitis C screen returned negative. The metabolic panel, which evaluates your kidney and liver function, electrolytes and blood sugar, returned grossly normal. In addition, the cell counts did not show any evidence of anemia, infection or other abnormality. Overall, great labs. Please continue to maintain a healthy diet low in salts/sugars/fatty&greasy foods with regular servings of fruits and vegetables and try to get in 30 minutes of aerobic exercise 5 or more days each week as able.     Recommend next annual checkup in 1 year.     ySlvester Joseph PA-C       Resulted Orders   Comprehensive metabolic panel (BMP + Alb, Alk Phos, ALT, AST, Total. Bili, TP)   Result Value Ref Range    Sodium 140 133 - 144 mmol/L    Potassium 4.3 3.4 - 5.3 mmol/L    Chloride 111 (H) 94 - 109 mmol/L    Carbon Dioxide (CO2) 26 20 - 32 mmol/L    Anion Gap 3 3 - 14 mmol/L    Urea Nitrogen 10 7 - 30 mg/dL    Creatinine 0.88 0.66 - 1.25 mg/dL    Calcium 8.6 8.5 - 10.1 mg/dL    Glucose 105 (H) 70 - 99 mg/dL    Alkaline Phosphatase 69 40 - 150 U/L    AST 10 0 - 45 U/L    ALT 26 0 - 70 U/L    Protein Total 6.8 6.8 - 8.8 g/dL    Albumin 3.9 3.4 - 5.0 g/dL    Bilirubin Total 0.6 0.2 - 1.3 mg/dL    GFR Estimate >90 >60 mL/min/1.73m2      Comment:      As of July 11, 2021, eGFR is calculated by the CKD-EPI creatinine equation, without race adjustment. eGFR can be influenced by muscle mass, exercise, and diet. The reported eGFR is an estimation only and is only applicable if the renal function is stable.   Hepatitis C Screen Reflex to HCV RNA Quant and Genotype   Result Value Ref Range    Hepatitis C Antibody Nonreactive Nonreactive    Narrative    Assay performance characteristics have not been established for  newborns, infants, and children.   COVID-19 Ben RBD Samira & Titer Reflex   Result Value Ref Range    SARS-CoV-2 Ben Ab, Interp, S Negative Negative      Comment:      No antibodies to SARS-CoV-2 spike glycoprotein detected.   Negative results may occur in serum collected too soon   following infection or vaccination, in immunosuppressed   patients or in patients with mild or asymptomatic   infection. This test does not rule out active and/or   recent COVID-19 infection or vaccination. Follow up   testing with a molecular test for SARS-CoV-2 is   recommended in symptomatic patients.    SARS-CoV-2 Ben Ab, Quant, S <0.40 <0.80 U/mL      Comment:         -------------------ADDITIONAL INFORMATION-------------------  Testing was performed using the Roche Elecsys Anti-SARS-  CoV-2 S Reagent assay from [employer], which has  received Emergency Use Authorization (EUA) by the U.S.  Food and Drug Administration.      Fact sheets for this Emergency Use Authorization (EUA)  assay can be found at the following links:  For Healthcare Providers:  https://www.fda.gov/media/396477/download  For Patients:  https://www.fda.gov/media/389790/download    Patient's Race White     Patient's Ethnicity Not        Comment:         Test Performed by:  [facility]  [address] [place] [address]  : Vince Mccoy M.D. Ph.D.; CLIA# [id number]   CBC with platelets and differential   Result Value Ref Range    WBC Count 6.4 4.0 - 11.0 10e3/uL    RBC Count 5.91 (H) 4.40 - 5.90 10e6/uL    Hemoglobin 17.6 13.3 - 17.7 g/dL    Hematocrit 49.6 40.0 - 53.0 %    MCV 84 78 - 100 fL    MCH 29.8 26.5 - 33.0 pg    MCHC 35.5 31.5 - 36.5 g/dL    RDW 12.2 10.0 - 15.0 %    Platelet Count 208 150 - 450 10e3/uL    % Neutrophils 62 %    % Lymphocytes 25 %    % Monocytes 6 %    % Eosinophils 5 %    % Basophils 1 %    % Immature Granulocytes 1 %    NRBCs per 100 WBC 0 <1 /100     Absolute Neutrophils 4.0 1.6 - 8.3 10e3/uL    Absolute Lymphocytes 1.6 0.8 - 5.3 10e3/uL    Absolute Monocytes 0.4 0.0 - 1.3 10e3/uL    Absolute Eosinophils 0.3 0.0 - 0.7 10e3/uL    Absolute Basophils 0.0 0.0 - 0.2 10e3/uL    Absolute Immature Granulocytes 0.1 (H) <=0.0 10e3/uL    Absolute NRBCs 0.0 10e3/uL       If you have any questions or concerns, please call the clinic at the number listed above.

## 2021-11-26 NOTE — PROGRESS NOTES
SUBJECTIVE:   CC: Abdi Mckeon is an 46 year old male who presents for preventative health visit.     Patient has been advised of split billing requirements and indicates understanding: Yes  Healthy Habits:     Getting at least 3 servings of Calcium per day:  Yes    Bi-annual eye exam:  NO    Dental care twice a year:  Yes    Sleep apnea or symptoms of sleep apnea:  None    Diet:  Regular (no restrictions)    Frequency of exercise:  None    Taking medications regularly:  Yes    Medication side effects:  None    PHQ-2 Total Score: 0    Additional concerns today:  No    Patient is a 46 year old male who presents today for annual checkup. He was last seen by PCP in 2019 and since then he says that he has remained in good general health. 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 He said that he took his daughter out hunting this past deer season, they saw a pineda that unfortunately ran off before his daughter could get a shot. He also spent some time in [place] hunting pheasants. Plans to continue pheasant hunting in [place] this winter as well as ice fishing. He does not have any health concerns at this time. Would like to be tested for covid antibodies as he said that he believes he had been infected in the past.     Today's PHQ-2 Score:   PHQ-2 ( 1999 Pfizer) 11/26/2021   Q1: Little interest or pleasure in doing things 0   Q2: Feeling down, depressed or hopeless 0   PHQ-2 Score 0   PHQ-2 Total Score (12-17 Years)- Positive if 3 or more points; Administer PHQ-A if positive -   Q1: Little interest or pleasure in doing things Not at all   Q2: Feeling down, depressed or hopeless Not at all   PHQ-2 Score 0       Abuse: Current or Past(Physical, Sexual or Emotional)- No  Do you feel safe in your environment? Yes    Have you ever done Advance Care Planning? (For example, a Health Directive, POLST, or a discussion with a medical provider or your  "loved ones about your wishes): Yes, patient states has an Advance Care Planning document and will bring a copy to the clinic.    Social History     Tobacco Use     Smoking status: Never Smoker     Smokeless tobacco: Never Used   Substance Use Topics     Alcohol use: Yes     Comment: rare     If you drink alcohol do you typically have >3 drinks per day or >7 drinks per week? No    Alcohol Use 11/26/2021   Prescreen: >3 drinks/day or >7 drinks/week? No   No flowsheet data found.    Last PSA: No results found for: PSA    Reviewed orders with patient. Reviewed health maintenance and updated orders accordingly - Yes  Lab work is in process    Reviewed and updated as needed this visit by clinical staff  Tobacco  Allergies  Meds   Med Hx  Surg Hx  Fam Hx  Soc Hx       Reviewed and updated as needed this visit by Provider                   Review of Systems   Constitutional: Negative for chills and fever.   HENT: Negative for congestion, ear pain, hearing loss and sore throat.    Eyes: Negative for pain and visual disturbance.   Respiratory: Negative for cough and shortness of breath.    Cardiovascular: Negative for chest pain, palpitations and peripheral edema.   Gastrointestinal: Negative for abdominal pain, constipation, diarrhea, heartburn, hematochezia and nausea.   Genitourinary: Negative for dysuria, frequency, genital sores, hematuria, impotence, penile discharge and urgency.   Musculoskeletal: Negative for arthralgias, joint swelling and myalgias.   Skin: Negative for rash.   Neurological: Negative for dizziness, weakness, headaches and paresthesias.   Psychiatric/Behavioral: Negative for mood changes. The patient is not nervous/anxious.        OBJECTIVE:   /80   Pulse 72   Temp 97.3  F (36.3  C) (Temporal)   Resp 16   Ht 1.727 m (5' 8\")   Wt 80.3 kg (177 lb)   SpO2 97%   BMI 26.91 kg/m      Physical Exam  GENERAL: healthy, alert and no distress  EYES: Eyes grossly normal to inspection, PERRL and " "conjunctivae and sclerae normal  HENT: ear canals and TM's normal, nose and mouth without ulcers or lesions  NECK: no adenopathy, no asymmetry, masses, or scars and thyroid normal to palpation  RESP: lungs clear to auscultation - no rales, rhonchi or wheezes  CV: regular rate and rhythm, normal S1 S2, no S3 or S4, no murmur, click or rub, no peripheral edema and peripheral pulses strong  ABDOMEN: soft, nontender, no hepatosplenomegaly, no masses and bowel sounds normal  MS: no gross musculoskeletal defects noted, no edema  NEURO: Normal strength and tone, mentation intact and speech normal  PSYCH: mentation appears normal, affect normal/bright    Diagnostic Test Results:  In process    ASSESSMENT/PLAN:       ICD-10-CM    1. Routine general medical examination at a health care facility  Z00.00 Comprehensive metabolic panel (BMP + Alb, Alk Phos, ALT, AST, Total. Bili, TP)     CBC with platelets and differential     Hepatitis C Screen Reflex to HCV RNA Quant and Genotype     COVID-19 Ben RBD Samira & Titer Reflex       Patient has been advised of split billing requirements and indicates understanding: Yes  COUNSELING:   Reviewed preventive health counseling, as reflected in patient instructions       Regular exercise       Healthy diet/nutrition       Prostate cancer screening    Estimated body mass index is 26.91 kg/m  as calculated from the following:    Height as of this encounter: 1.727 m (5' 8\").    Weight as of this encounter: 80.3 kg (177 lb).     Weight management plan: Discussed healthy diet and exercise guidelines    He reports that he has never smoked. He has never used smokeless tobacco.      Counseling Resources:  ATP IV Guidelines  Pooled Cohorts Equation Calculator  FRAX Risk Assessment  ICSI Preventive Guidelines  Dietary Guidelines for Americans, 2010  USDA's MyPlate  ASA Prophylaxis  Lung CA Screening    IS[facility]  "

## 2021-11-29 LAB
SARS-COV-2 AB SERPL IA-ACNC: <0.4 U/ML
SARS-COV-2 AB SERPL QL IA: NEGATIVE

## 2021-12-18 ENCOUNTER — HEALTH MAINTENANCE LETTER (OUTPATIENT)
Age: 46
End: 2021-12-18

## 2022-09-20 ENCOUNTER — OFFICE VISIT (OUTPATIENT)
Dept: PODIATRY | Facility: CLINIC | Age: 47
End: 2022-09-20
Payer: COMMERCIAL

## 2022-09-20 VITALS
HEIGHT: 67 IN | DIASTOLIC BLOOD PRESSURE: 70 MMHG | BODY MASS INDEX: 27.47 KG/M2 | SYSTOLIC BLOOD PRESSURE: 108 MMHG | WEIGHT: 175 LBS

## 2022-09-20 DIAGNOSIS — M72.2 PLANTAR FASCIITIS: Primary | ICD-10-CM

## 2022-09-20 PROCEDURE — 99213 OFFICE O/P EST LOW 20 MIN: CPT | Performed by: PODIATRIST

## 2022-09-20 ASSESSMENT — PAIN SCALES - GENERAL: PAINLEVEL: NO PAIN (0)

## 2022-09-20 NOTE — PROGRESS NOTES
Chief Complaint   Patient presents with     RECHECK     Under control with orthotics - B/L plantar fasciitis, onset July 2020; LOV 10/5/2020     Consult     Request order for new orthotics       HPI:  Patient has a history of Planter fasciitis left and right heel pain presents with work boots on today.  He notes that as long as he wears his orthotics he has no pain but he is not able to wear his orthotics and all of his shoes and he would like to have multiple pairs of orthotics.  There is starting to breakdown.  When he does not wear his orthotics he has pain burning aching stabbing guarding with palpation and walking.  Requesting multiple pairs of orthotics.  Massage.    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    ROS: 10 point ROS neg other than the symptoms noted above in the HPI.    Patient Active Problem List   Diagnosis     CARDIOVASCULAR SCREENING; LDL GOAL LESS THAN 160       PAST MEDICAL HISTORY: History reviewed. No pertinent past medical history.  PAST SURGICAL HISTORY: History reviewed. No pertinent surgical history.  MEDICATIONS: No current outpatient medications on file.  ALLERGIES:    Allergies   Allergen Reactions     No Known Drug Allergies      SOCIAL HISTORY:   Social History     Socioeconomic History     Marital status:      Spouse name: Not on file     Number of children: Not on file     Years of education: Not on file     Highest education level: Not on file   Occupational History     Not on file   Tobacco Use     Smoking status: Never Smoker     Smokeless tobacco: Never Used   Vaping Use     Vaping Use: Never used   Substance and Sexual Activity     Alcohol use: Yes     Comment: rare     Drug use: No     Sexual activity: Yes     Partners: Female     Birth control/protection: Pill   Other Topics Concern     Parent/sibling w/ CABG, MI or angioplasty before 65F 55M? Not Asked   Social History Narrative     Not on file     Social Determinants of Health     Financial Resource Strain: Not on file  "  Food Insecurity: Not on file   Transportation Needs: Not on file   Physical Activity: Not on file   Stress: Not on file   Social Connections: Not on file   Intimate Partner Violence: Not on file   Housing Stability: Not on file     FAMILY HISTORY:   Family History   Problem Relation Age of Onset     Family History Negative Mother      Family History Negative Father      Hyperlipidemia Father      Cerebrovascular Disease Maternal Grandmother      Prostate Cancer Paternal Grandfather      Diabetes No family hx of      Coronary Artery Disease No family hx of      Hypertension No family hx of        EXAM:Vitals: /70 (BP Location: Left arm, Patient Position: Sitting, Cuff Size: Adult Regular)   Ht 1.69 m (5' 6.54\")   Wt 79.4 kg (175 lb)   BMI 27.79 kg/m    BMI= Body mass index is 27.79 kg/m .    General appearance: Patient is alert and fully cooperative with history & exam.  No sign of distress is noted during the visit.     Psychiatric: Affect is pleasant & appropriate.  Patient appears motivated to improve health.     Respiratory: Breathing is regular & unlabored while sitting.     HEENT: Hearing is intact to spoken word.  Speech is clear.  No gross evidence of visual impairment that would impact ambulation.     Vascular: DP & PT 2/4 & regular bilaterally.  No significant edema, rubor or varicosities noted.  CFT and skin temperature is normal to both lower extremities.       Neurologic: Lower extremity sensation is intact to light touch.  No evidence of weakness in the lower extremities.  No evidence of neuropathy and negative tinel sign.     Dermatologic: Skin is intact to both lower extremities without significant lesions, rash or abrasion.  Normal texture turgor and tone. No paronychia or evidence of soft tissue infection is noted.    Musculoskeletal: Patient is ambulatory without assistive device or brace.  Minimal discomfort is noted with firm palpation along the medial band of the plantar fascia right " foot most notably at the origination upon the calcaneus not through the arch.  No pain with compression of the calcaneus medial to lateral or with palpation of the achilles, peroneal or posterior tibial tendons.  Slightly more than 0  of ankle joint dorsiflexion without crepitus or pain throughout the ankle, subtalar or midtarsal joints.  No pain or limitations throughout manual muscle strength testing plus 5/5 to all four quadrants bilateral.  No palpable edema noted.      Radiographs:  Osteophyte noted about the plantar calcaneus consistent with plantar fasciitis.     ASSESSMENT:       ICD-10-CM    1. Plantar fasciitis  M72.2 Orthotics and Prosthetics DME Orthotic; Foot Orthotics; Bilateral       PLAN:  Reviewed patient's chart in [facility] and discussed etiology and treatment options.      Treatments:  10/5/2020  Obtained and interpreted radiographs.   Discontinue barefoot walking or unsupported walking in shoes without shank.  Dispensed written instructions to obtain appropriate shoe gear and/or OTC inserts.  Dispensed anterior night splint to use all night every night.  Prescription oral Voltaren for a short course. Discussed risks.  Prescription for custom molded orthotics 10/5/2020  Follow up in 4-5 weeks     9/20/2022   Order for orthotics, multiple pairs  Discussed options  Discussed appropriate shoe gear etiology and treatment options  All questions were answered follow-up as needed      Harris Smith DPM

## 2022-09-20 NOTE — LETTER
9/20/2022         RE: Abdi Mckeon  [address]  [place] [address]        Dear Colleague,    Thank you for referring your patient, Abdi Mckeon, to the [facility]. Please see a copy of my visit note below.    Chief Complaint   Patient presents with     RECHECK     Under control with orthotics - B/L plantar fasciitis, onset July 2020; LOV 10/5/2020     Consult     Request order for new orthotics       HPI:  Patient has a history of Planter fasciitis left and right heel pain presents with work boots on today.  He notes that as long as he wears his orthotics he has no pain but he is not able to wear his orthotics and all of his shoes and he would like to have multiple pairs of orthotics.  There is starting to breakdown.  When he does not wear his orthotics he has pain burning aching stabbing guarding with palpation and walking.  Requesting multiple pairs of orthotics.  Massage.    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    ROS: 10 point ROS neg other than the symptoms noted above in the HPI.    Patient Active Problem List   Diagnosis     CARDIOVASCULAR SCREENING; LDL GOAL LESS THAN 160       PAST MEDICAL HISTORY: History reviewed. No pertinent past medical history.  PAST SURGICAL HISTORY: History reviewed. No pertinent surgical history.  MEDICATIONS: No current outpatient medications on file.  ALLERGIES:    Allergies   Allergen Reactions     No Known Drug Allergies      SOCIAL HISTORY:   Social History     Socioeconomic History     Marital status:      Spouse name: Not on file     Number of children: Not on file     Years of education: Not on file     Highest education level: Not on file   Occupational History     Not on file   Tobacco Use     Smoking status: Never Smoker     Smokeless tobacco: Never Used   Vaping Use     Vaping Use: Never used   Substance and Sexual Activity     Alcohol use: Yes     Comment: rare     Drug use: No     Sexual activity: Yes     Partners: Female      "Birth control/protection: Pill   Other Topics Concern     Parent/sibling w/ CABG, MI or angioplasty before 65F 55M? Not Asked   Social History Narrative     Not on file     Social Determinants of Health     Financial Resource Strain: Not on file   Food Insecurity: Not on file   Transportation Needs: Not on file   Physical Activity: Not on file   Stress: Not on file   Social Connections: Not on file   Intimate Partner Violence: Not on file   Housing Stability: Not on file     FAMILY HISTORY:   Family History   Problem Relation Age of Onset     Family History Negative Mother      Family History Negative Father      Hyperlipidemia Father      Cerebrovascular Disease Maternal Grandmother      Prostate Cancer Paternal Grandfather      Diabetes No family hx of      Coronary Artery Disease No family hx of      Hypertension No family hx of        EXAM:Vitals: /70 (BP Location: Left arm, Patient Position: Sitting, Cuff Size: Adult Regular)   Ht 1.69 m (5' 6.54\")   Wt 79.4 kg (175 lb)   BMI 27.79 kg/m    BMI= Body mass index is 27.79 kg/m .    General appearance: Patient is alert and fully cooperative with history & exam.  No sign of distress is noted during the visit.     Psychiatric: Affect is pleasant & appropriate.  Patient appears motivated to improve health.     Respiratory: Breathing is regular & unlabored while sitting.     HEENT: Hearing is intact to spoken word.  Speech is clear.  No gross evidence of visual impairment that would impact ambulation.     Vascular: DP & PT 2/4 & regular bilaterally.  No significant edema, rubor or varicosities noted.  CFT and skin temperature is normal to both lower extremities.       Neurologic: Lower extremity sensation is intact to light touch.  No evidence of weakness in the lower extremities.  No evidence of neuropathy and negative tinel sign.     Dermatologic: Skin is intact to both lower extremities without significant lesions, rash or abrasion.  Normal texture turgor " and tone. No paronychia or evidence of soft tissue infection is noted.    Musculoskeletal: Patient is ambulatory without assistive device or brace.  Minimal discomfort is noted with firm palpation along the medial band of the plantar fascia right foot most notably at the origination upon the calcaneus not through the arch.  No pain with compression of the calcaneus medial to lateral or with palpation of the achilles, peroneal or posterior tibial tendons.  Slightly more than 0  of ankle joint dorsiflexion without crepitus or pain throughout the ankle, subtalar or midtarsal joints.  No pain or limitations throughout manual muscle strength testing plus 5/5 to all four quadrants bilateral.  No palpable edema noted.      Radiographs:  Osteophyte noted about the plantar calcaneus consistent with plantar fasciitis.     ASSESSMENT:       ICD-10-CM    1. Plantar fasciitis  M72.2 Orthotics and Prosthetics DME Orthotic; Foot Orthotics; Bilateral       PLAN:  Reviewed [facility] chart in [facility] and discussed etiology and treatment options.      Treatments:  10/5/2020  Obtained and interpreted radiographs.   Discontinue barefoot walking or unsupported walking in shoes without shank.  Dispensed written instructions to obtain appropriate shoe gear and/or OTC inserts.  Dispensed anterior night splint to use all night every night.  Prescription oral Voltaren for a short course. Discussed risks.  Prescription for custom molded orthotics 10/5/2020  Follow up in 4-5 weeks     9/20/2022   Order for orthotics, multiple pairs  Discussed options  Discussed appropriate shoe gear etiology and treatment options  All questions were answered follow-up as needed      Harris Smith DPM      Again, thank you for allowing me to participate in the care of your patient.        Sincerely,        Harris Smith DPM

## 2022-10-09 ENCOUNTER — HEALTH MAINTENANCE LETTER (OUTPATIENT)
Age: 47
End: 2022-10-09

## 2023-02-12 ENCOUNTER — HEALTH MAINTENANCE LETTER (OUTPATIENT)
Age: 48
End: 2023-02-12

## 2023-11-21 ENCOUNTER — OFFICE VISIT (OUTPATIENT)
Dept: FAMILY MEDICINE | Facility: CLINIC | Age: 48
End: 2023-11-21
Payer: COMMERCIAL

## 2023-11-21 VITALS
DIASTOLIC BLOOD PRESSURE: 74 MMHG | TEMPERATURE: 97.7 F | WEIGHT: 181.8 LBS | RESPIRATION RATE: 12 BRPM | HEIGHT: 67 IN | SYSTOLIC BLOOD PRESSURE: 112 MMHG | BODY MASS INDEX: 28.53 KG/M2 | OXYGEN SATURATION: 99 % | HEART RATE: 56 BPM

## 2023-11-21 DIAGNOSIS — Z13.6 CARDIOVASCULAR SCREENING; LDL GOAL LESS THAN 130: ICD-10-CM

## 2023-11-21 DIAGNOSIS — Z00.00 ENCOUNTER FOR ROUTINE HISTORY AND PHYSICAL EXAM FOR MALE: ICD-10-CM

## 2023-11-21 DIAGNOSIS — Z12.11 SCREEN FOR COLON CANCER: Primary | ICD-10-CM

## 2023-11-21 DIAGNOSIS — Z12.5 SCREENING FOR PROSTATE CANCER: ICD-10-CM

## 2023-11-21 DIAGNOSIS — R25.1 TREMOR OF LEFT HAND: ICD-10-CM

## 2023-11-21 LAB
CHOLEST SERPL-MCNC: 151 MG/DL
HDLC SERPL-MCNC: 38 MG/DL
LDLC SERPL CALC-MCNC: 90 MG/DL
NONHDLC SERPL-MCNC: 113 MG/DL
PSA SERPL DL<=0.01 NG/ML-MCNC: 0.96 NG/ML (ref 0–2.5)
TRIGL SERPL-MCNC: 114 MG/DL

## 2023-11-21 PROCEDURE — 80061 LIPID PANEL: CPT | Performed by: FAMILY MEDICINE

## 2023-11-21 PROCEDURE — 99396 PREV VISIT EST AGE 40-64: CPT | Performed by: FAMILY MEDICINE

## 2023-11-21 PROCEDURE — 36415 COLL VENOUS BLD VENIPUNCTURE: CPT | Performed by: FAMILY MEDICINE

## 2023-11-21 PROCEDURE — G0103 PSA SCREENING: HCPCS | Performed by: FAMILY MEDICINE

## 2023-11-21 ASSESSMENT — ENCOUNTER SYMPTOMS
NERVOUS/ANXIOUS: 0
ARTHRALGIAS: 0
EYE PAIN: 0
COUGH: 0
NAUSEA: 0
HEMATURIA: 0
SORE THROAT: 0
WEAKNESS: 0
MYALGIAS: 0
ABDOMINAL PAIN: 0
DIZZINESS: 0
DYSURIA: 0
SHORTNESS OF BREATH: 0
HEADACHES: 0
HEARTBURN: 0
CONSTIPATION: 0
JOINT SWELLING: 0
CHILLS: 0
FREQUENCY: 0
PALPITATIONS: 0
FEVER: 0
PARESTHESIAS: 0
DIARRHEA: 0
HEMATOCHEZIA: 0

## 2023-11-21 ASSESSMENT — PAIN SCALES - GENERAL: PAINLEVEL: NO PAIN (0)

## 2023-11-21 NOTE — PROGRESS NOTES
"SUBJECTIVE:   Abdi is a 48 year old, presenting for the following:  Physical        11/21/2023     9:07 AM   Additional Questions   Roomed by Vicky TORRES       Healthy Habits:     Getting at least 3 servings of Calcium per day:  Yes    Bi-annual eye exam:  NO    Dental care twice a year:  Yes    Sleep apnea or symptoms of sleep apnea:  None    Diet:  Regular (no restrictions)    Frequency of exercise:  2-3 days/week    Taking medications regularly:  Yes    Medication side effects:  None    Additional concerns today:  Yes      Today's PHQ-2 Score:       11/21/2023     9:00 AM   PHQ-2 ( 1999 Pfizer)   Q1: Little interest or pleasure in doing things 0   Q2: Feeling down, depressed or hopeless 0   PHQ-2 Score 0   Q1: Little interest or pleasure in doing things Not at all   Q2: Feeling down, depressed or hopeless Not at all   PHQ-2 Score 0                       Social History     Tobacco Use    Smoking status: Never    Smokeless tobacco: Never   Substance Use Topics    Alcohol use: Yes     Comment: rare             11/21/2023     9:00 AM   Alcohol Use   Prescreen: >3 drinks/day or >7 drinks/week? No       Last PSA: No results found for: \"PSA\"    Reviewed orders with patient. Reviewed health maintenance and updated orders accordingly - Yes  BP Readings from Last 3 Encounters:   11/21/23 112/74   09/20/22 108/70   11/26/21 112/80    Wt Readings from Last 3 Encounters:   11/21/23 82.5 kg (181 lb 12.8 oz)   09/20/22 79.4 kg (175 lb)   11/26/21 80.3 kg (177 lb)                  Patient Active Problem List   Diagnosis    CARDIOVASCULAR SCREENING; LDL GOAL LESS THAN 160     No past surgical history on file.    Social History     Tobacco Use    Smoking status: Never    Smokeless tobacco: Never   Substance Use Topics    Alcohol use: Yes     Comment: rare     Family History   Problem Relation Age of Onset    Family History Negative Mother     Family History Negative Father     Hyperlipidemia Father     Cerebrovascular Disease " "Maternal Grandmother     Prostate Cancer Paternal Grandfather     Diabetes No family hx of     Coronary Artery Disease No family hx of     Hypertension No family hx of          No current outpatient medications on file.       Reviewed and updated as needed this visit by clinical staff                  Reviewed and updated as needed this visit by Provider                     Review of Systems   Constitutional:  Negative for chills and fever.   HENT:  Negative for congestion, ear pain, hearing loss and sore throat.    Eyes:  Negative for pain and visual disturbance.   Respiratory:  Negative for cough and shortness of breath.    Cardiovascular:  Negative for chest pain, palpitations and peripheral edema.   Gastrointestinal:  Negative for abdominal pain, constipation, diarrhea, heartburn, hematochezia and nausea.   Genitourinary:  Negative for dysuria, frequency, genital sores, hematuria, impotence, penile discharge and urgency.   Musculoskeletal:  Negative for arthralgias, joint swelling and myalgias.   Skin:  Negative for rash.   Neurological:  Negative for dizziness, weakness, headaches and paresthesias.   Psychiatric/Behavioral:  Negative for mood changes. The patient is not nervous/anxious.      OBJECTIVE:   /74   Pulse 56   Temp 97.7  F (36.5  C) (Temporal)   Resp 12   Ht 1.689 m (5' 6.5\")   Wt 82.5 kg (181 lb 12.8 oz)   SpO2 99%   BMI 28.90 kg/m        Physical Exam  GENERAL: healthy, alert and no distress  EYES: Eyes grossly normal to inspection, PERRL and conjunctivae and sclerae normal  HENT: ear canals and TM's normal, nose and mouth without ulcers or lesions  NECK: no adenopathy, no asymmetry, masses, or scars and thyroid normal to palpation  RESP: lungs clear to auscultation - no rales, rhonchi or wheezes  CV: regular rate and rhythm, normal S1 S2, no S3 or S4, no murmur, click or rub, no peripheral edema and peripheral pulses strong  ABDOMEN: soft, nontender, no hepatosplenomegaly, no masses " and bowel sounds normal  MS: no gross musculoskeletal defects noted, no edema  SKIN: no suspicious lesions or rashes and small 1 x 1 cm actinic keratosis on the right forearm treated with liquid nitrogen freeze thaw technique in the usual fashion.   NEURO: Normal strength and tone, mentation intact and speech normal, patient did recheck with his left hand and do fine motor  BACK: no CVA tenderness, no paralumbar tenderness  PSYCH: mentation appears normal, affect normal/bright    Diagnostic Test Results:  Labs reviewed in Epic  Results for orders placed or performed in visit on 11/21/23 (from the past 24 hour(s))   Lipid panel reflex to direct LDL Non-fasting   Result Value Ref Range    Cholesterol 151 <200 mg/dL    Triglycerides 114 <150 mg/dL    Direct Measure HDL 38 (L) >=40 mg/dL    LDL Cholesterol Calculated 90 <=100 mg/dL    Non HDL Cholesterol 113 <130 mg/dL    Narrative    Cholesterol  Desirable:  <200 mg/dL    Triglycerides  Normal:  Less than 150 mg/dL  Borderline High:  150-199 mg/dL  High:  200-499 mg/dL  Very High:  Greater than or equal to 500 mg/dL    Direct Measure HDL  Female:  Greater than or equal to 50 mg/dL   Male:  Greater than or equal to 40 mg/dL    LDL Cholesterol  Desirable:  <100mg/dL  Above Desirable:  100-129 mg/dL   Borderline High:  130-159 mg/dL   High:  160-189 mg/dL   Very High:  >= 190 mg/dL    Non HDL Cholesterol  Desirable:  130 mg/dL  Above Desirable:  130-159 mg/dL  Borderline High:  160-189 mg/dL  High:  190-219 mg/dL  Very High:  Greater than or equal to 220 mg/dL   PSA, screen   Result Value Ref Range    Prostate Specific Antigen Screen 0.96 0.00 - 2.50 ng/mL    Narrative    This result is obtained using the Roche Elecsys total PSA method on the tori [id number] immunoassay analyzer. Results obtained with different assay methods or kits cannot be used interchangeably.       ASSESSMENT/PLAN:       ICD-10-CM    1. Screen for colon cancer  Z12.11 Colonoscopy Screening   Referral      2. Encounter for routine history and physical exam for male  Z00.00 REVIEW OF HEALTH MAINTENANCE PROTOCOL ORDERS      3. CARDIOVASCULAR SCREENING; LDL GOAL LESS THAN 130  Z13.6 Lipid panel reflex to direct LDL Non-fasting     Lipid panel reflex to direct LDL Non-fasting      4. Screening for prostate cancer  Z12.5 PSA, screen     PSA, screen      5. Tremor of left hand  R25.1 Adult Neurology  Referral        Patient because he states is getting worse and is starting to bother him at work.        COUNSELING:   Reviewed preventive health counseling, as reflected in patient instructions       Regular exercise       Healthy diet/nutrition        He reports that he has never smoked. He has never used smokeless tobacco.          Varun Salazar MD, MD  [facility]

## 2023-11-24 ENCOUNTER — TELEPHONE (OUTPATIENT)
Dept: NEUROLOGY | Facility: CLINIC | Age: 48
End: 2023-11-24
Payer: COMMERCIAL

## 2023-11-24 NOTE — TELEPHONE ENCOUNTER
Pt will call to schedule with NEW MOVEMENT DISORDER he stated he needs to over his schedule. Provided patient with our scheduling phone number [phone]. Pt stated  he will call next week to schedule.    Vanessa Caldera on 11/24/2023 at 3:06 PM

## 2024-01-17 ENCOUNTER — TELEPHONE (OUTPATIENT)
Dept: GASTROENTEROLOGY | Facility: CLINIC | Age: 49
End: 2024-01-17
Payer: COMMERCIAL

## 2024-01-17 NOTE — TELEPHONE ENCOUNTER
"Endoscopy Scheduling Screen    Have you had a positive Covid test in the last 14 days?  No    Are you active on MyChart?   Yes    What insurance is in the chart?  Other:  BCBS    Ordering/Referring Provider: Varun Salazar MD   (If ordering provider performs procedure, schedule with ordering provider unless otherwise instructed. )    BMI: Estimated body mass index is 28.9 kg/m  as calculated from the following:    Height as of 11/21/23: 1.689 m (5' 6.5\").    Weight as of 11/21/23: 82.5 kg (181 lb 12.8 oz).     Sedation Ordered  moderate sedation.   If patient BMI > 50 do not schedule in [facility].    If patient BMI > 45 do not schedule at [facility].    Are you taking methadone or Suboxone?  No    Are you taking any prescription medications for pain 3 or more times per week?   NO - No RN review required.    Do you have a history of malignant hyperthermia or adverse reaction to anesthesia?  No    (Females) Are you currently pregnant?   No     Have you been diagnosed or told you have pulmonary hypertension?   No    Do you have an LVAD?  No    Have you been told you have moderate to severe sleep apnea?  No    Have you been told you have COPD, asthma, or any other lung disease?  No    Do you have any heart conditions?  No     Have you ever had an organ transplant?   No    Have you ever had or are you awaiting a heart or lung transplant?   No    Have you had a stroke or transient ischemic attack (TIA aka \"mini stroke\" in the last 6 months?   No    Have you been diagnosed with or been told you have cirrhosis of the liver?   No    Are you currently on dialysis?   No    Do you need assistance transferring?   No    BMI: Estimated body mass index is 28.9 kg/m  as calculated from the following:    Height as of 11/21/23: 1.689 m (5' 6.5\").    Weight as of 11/21/23: 82.5 kg (181 lb 12.8 oz).     Is patients BMI > 40 and scheduling location [facility]?  No    Do you take an injectable medication for weight loss or diabetes (excluding " insulin)?  No    Do you take the medication Naltrexone?  No    Do you take blood thinners?  No       Prep   Are you currently on dialysis or do you have chronic kidney disease?  No    Do you have a diagnosis of diabetes?  No    Do you have a diagnosis of cystic fibrosis (CF)?  No    On a regular basis do you go 3 -5 days between bowel movements?  No    BMI > 40?  No    Preferred Pharmacy:    [place], [place] - [address]   [address] [facility] [address]  Phone: [phone] Fax: [phone]    [facility]  [address] [address]  Phone: [phone] Fax: [phone]      Final Scheduling Details   Colonoscopy prep sent?  Standard MiraLAX    Procedure scheduled  Colonoscopy    Surgeon:  Favian     Date of procedure:  03/04/2024     Pre-OP / PAC:   No - Not required for this site.    Location  [facility] - Patient preference.    Sedation   MAC/Deep Sedation  per location      Patient Reminders:   You will receive a call from a Nurse to review instructions and health history.  This assessment must be completed prior to your procedure.  Failure to complete the Nurse assessment may result in the procedure being cancelled.      On the day of your procedure, please designate an adult(s) who can drive you home stay with you for the next 24 hours. The medicines used in the exam will make you sleepy. You will not be able to drive.      You cannot take public transportation, ride share services, or non-medical taxi service without a responsible caregiver.  Medical transport services are allowed with the requirement that a responsible caregiver will receive you at your destination.  We require that drivers and caregivers are confirmed prior to your procedure.

## 2024-01-17 NOTE — TELEPHONE ENCOUNTER
Caller: Elicia  Reason for Reschedule/Cancellation (please be detailed, any staff messages or encounters to note?): Patient unavailable      Prior to reschedule please review:  Ordering Provider:     Varun Salazar MD in [facility]     Sedation per order: moderate  Does patient have any ASC Exclusions, please identify?: n      Notes on Cancelled Procedure:  Procedure: Lower Endoscopy [Colonoscopy]   Date: 03/04/2024  Location: [facility]; [address] , [place] [address]  Surgeon: Favian      Rescheduled: Yes  Procedure: Lower Endoscopy [Colonoscopy]   Date: 03/11/2024  Location: [facility]; [address] , [place] [address]  Surgeon: Favian  Sedation Level Scheduled  MAC,  Reason for Sedation Level per location  Prep/Instructions updated and sent: y       Send In - basket message to Panc - Sid Pool if [facility]  procedure is canceled or rescheduled: [ N/A, YES or NO] na

## 2024-01-17 NOTE — PROGRESS NOTES
Diagnosis/Summary/Recommendations:    PATIENT: Abdi Mckeon  48 year old male     : 1975    BELLA: 2024    MRN: [id number]    Tremors - Recs in epic - Scheduled per spouse - Referred by GRADY MATA    [id number]   [facility] [id number] [phone] (M)   [phone] (H)     [email or web address]    Chapis Mckeon spouse  [phone]     Assessment:  (R25.1) Tremor  (primary encounter diagnosis)  Left hand tremor  Referral from ERIK Mata - 2023 visit   Right handed  No family history of tremor     Review of diagnosis    Tremor    Avoidance of dopamine blockers   Not taking medications      Motor complication review   N/a    Review of Impulse control disorders   N/a    Review of surgical or medication options   reviewed    Gait/Balance/Falls   No falls    Exercise/Therapy performed/offered   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     Concussion from fall - 5  years ago   Had persistent vertigo for 2 weeks.   No profound loss of consciousness  But laid on the ground and could not open his eyes for a while  Seeing [employer]    Mood   [employer]   to [employer]  2 daughters  No mood changes  Lives in [place]  Wife Works in [employer] in [place]    Hallucinations/delusions   no    Sleep   No problems  Snores  Talks in his sleep - long standing since little  Somnambulism   Sleeps through the night    Bladder/Renal/Prostate/Gyn/Other   Denies bladder issues  No renal stones  No nocturia    GI/Constipation/GERD   denies    ENDO/Lipid/DM/Bone density/Thyroid  denies    Cardio/heart/Hyper or Hypotensive   No problems    Vision/Dry Eyes/Cataracts/Glaucoma/Macular   May have some eye issues and may need cheaters - has an appointment tomorrow     Heme/Anticoagulation/Antiplatelet/Anemia/Other  Not taking aspirin    ENT/Resp  Denies  No loss of smell or taste  May have had covid early  feb 2020  Was tired and lasted for 4 days and would go home at work and sit in chair and fall asleep  Had sweat and chills    Skin/Cancer/Seborrhea/other  Poison ivy when younger     Musculoskeletal/Pain/Headache  Low  back pain  Does not take anything   chiropractor    Other:      Medications                                                                                                                                                                              Examination   Normal voice volume  Normal facial expression  No head tremor  No tongue or vocal tremor  Normal tone and power  No bradykinesia or freezing  No resting tremor  He has a bilateral left > right postural and action tremor  May be worse on finger to nose to a proximal target  Has leg tremor with extension  Has normal gait, arm swing, station and stability.     Plan:    Essential tremor   Discussed alternative diagnosis and testing that is done (datscan, brain mri, genetics)  Presently would not recommend tests or treatment  Additional details are below   Granted access to his wife Elicia hull    Most likely this is essential tremor.   It is possible that there are other causes for your tremor.  An overactive thyroid can cause tremor.  Treatment for essential tremor   1. Beta-blockers, e.g., metoprolol, propranolol and nadolol are beta-blockers that can be given every day or used as needed for tremor.  A common regimen is to give someone 10-20 mg of propranolol which they take 30 - 60 minutes before an activity, e.g.  A talk or wedding. Side effects of these medications primarily when used in much higher doses can include light-headedness, low blood pressure, worsened asthma (if you have this) as well as an assortment of other items. Please consult with your pharmacist and primary doctor if you are not sure that it is safe if you can take these medications.  2. The anticonvulsant mysoline (primidone) is an effective medication but it can  be sedating and cause worsen memory, balance, etc. I would cautious about using this medication. If it is tried it should be started at a low dose and adjust upwards:  A. Really slow titration schedule: Use the liquid formulation and start at 10mg and increase each week till ultimately get to 50mg daily and then would use 50mg tabs to titrate up to 100mg then 150mg daily with weekly dose increases by 50mg.  B. Standard titration schedule: start at 1/2 50mg a day and then weekly increase by 25mg till daily dose is 150mg.   Monitoring for blood or liver abnormalities may be needed with this medication, which can also affect other medications that you are taking. Please consult with your pharmacist and primary doctor if you are not sure that it is safe if you can take these medication.  3. Other medications: gabapentin (neurontin), topiramate (topamax), levetiracetam (keppra) and benzodiazepines (e.g., valium/diazepam, ativan/lorazepam,  klonopin/clonazepam) have all been used to limited benefit or have too many side effects, eg. Valium would make you sleepy and unsteady. Topiramate may help but can cause memory difficulties and kidney stones. Please consult with your pharmacist and primary doctor if you are not sure that it is safe if you can take these medications.  4. Brain surgery - deep brain stimulation (DBS) has been used to treat tremor for some individuals. Prior to surgery memory testing is required wit Dr. Sera Jay or another neuropsychologist.  You will also need to undergo a brain MRI scan (if you are able to safely undergo one) and have your tremor characterized with a tremor rating scale. We then meet with our surgical group and if appears that you are a good surgical candidate and are still interested in surgery you will meet with our neurosurgeon to discuss this treatment further.      Coding statement:   Medical Decision Making:  #  Chronic progressive medical conditions addressed  - see above --    Review and/or interpretation of unique test or documentation from a provider outside of neurology no   Independent historian provided additional details  no I  Prescription drug management and review of potential side effects and/or monitoring for side effects  -- see above ---  Health impacted by social determinants of health  no    I have reviewed the note as documented above.  This accurately captures the substance of my conversation with the patient and total time spent preparing for visit, executing visit and completing visit on the day of the visit:  45 minutes.  The portion of this total time included face to face time 42 minutes     The longitudinal plan of care for Abdi Mckeon was addressed during this visit. Due to the added complexity in care, I will continue to support Abdi Mckeon in the subsequent management of this condition(s) and with the ongoing continuity of care of this condition(s).      Cody Moore MD     ______________________________________    Last visit date and details:       February 1, 2024  Jackie Bray     MV    2/1/24 10:21 AM  Note  Action 2/1/24 MV 10.21am   Action Taken Called pt and he stated no outside neuro records nor imaging of the brain and spine.               RECORDS RECEIVED FROM: internal   REASON FOR VISIT: tremors   Date of Appt: 2/16/24   NOTES (FOR ALL VISITS) STATUS DETAILS   OFFICE NOTE from referring provider Internal Dr Varun Salazar @ [facility]:  11/21/23   MEDICATION LIST Internal     IMAGING  (FOR ALL VISITS)       MRI (HEAD, NECK, SPINE) N/A     CT (HEAD, NECK, SPINE) N/A                 CM       1. Screen for colon cancer  Z12.11 Colonoscopy Screening  Referral       2. Encounter for routine history and physical exam for male  Z00.00 REVIEW OF HEALTH MAINTENANCE PROTOCOL ORDERS       3. CARDIOVASCULAR SCREENING; LDL GOAL LESS THAN 130  Z13.6 Lipid panel reflex to direct LDL Non-fasting       Lipid panel reflex to direct LDL  Non-fasting       4. Screening for prostate cancer  Z12.5 PSA, screen       PSA, screen       5. Tremor of left hand  R25.1 Adult Neurology  Referral               ______________________________________      Patient was asked about 14 Review of systems including changes in vision (dry eyes, double vision), hearing, heart, lungs, musculoskeletal, depression, anxiety, snoring, RBD, insomnia, urinary frequency, urinary urgency, constipation, swallowing problems, hematological, ID, allergies, skin problems: seborrhea, endocrinological: thyroid, diabetes, cholesterol; balance, weight changes, and other neurological problems and these were not significant at this time except for   Patient Active Problem List   Diagnosis    CARDIOVASCULAR SCREENING; LDL GOAL LESS THAN 160          Allergies   Allergen Reactions    No Known Drug Allergy      No past surgical history on file.  No past medical history on file.  Social History     Socioeconomic History    Marital status:      Spouse name: Not on file    Number of children: Not on file    Years of education: Not on file    Highest education level: Not on file   Occupational History    Not on file   Tobacco Use    Smoking status: Never    Smokeless tobacco: Never   Vaping Use    Vaping Use: Never used   Substance and Sexual Activity    Alcohol use: Yes     Comment: rare    Drug use: No    Sexual activity: Yes     Partners: Female     Birth control/protection: Pill   Other Topics Concern    Parent/sibling w/ CABG, MI or angioplasty before 65F 55M? Not Asked   Social History Narrative    Not on file     Social Determinants of Health     Financial Resource Strain: Low Risk  (11/21/2023)    Financial Resource Strain     Within the past 12 months, have you or your family members you live with been unable to get utilities (heat, electricity) when it was really needed?: No   Food Insecurity: Low Risk  (11/21/2023)    Food Insecurity     Within the past 12 months, did  you worry that your food would run out before you got money to buy more?: No     Within the past 12 months, did the food you bought just not last and you didn t have money to get more?: No   Transportation Needs: Low Risk  (11/21/2023)    Transportation Needs     Within the past 12 months, has lack of transportation kept you from medical appointments, getting your medicines, non-medical meetings or appointments, work, or from getting things that you need?: No   Physical Activity: Not on file   Stress: Not on file   Social Connections: Not on file   Interpersonal Safety: Low Risk  (11/21/2023)    Interpersonal Safety     Do you feel physically and emotionally safe where you currently live?: Yes     Within the past 12 months, have you been hit, slapped, kicked or otherwise physically hurt by someone?: No     Within the past 12 months, have you been humiliated or emotionally abused in other ways by your partner or ex-partner?: No   Housing Stability: Low Risk  (11/21/2023)    Housing Stability     Do you have housing? : Yes     Are you worried about losing your housing?: No       Drug and lactation database from the [place] National [employer] of Medicine:  http://toxnet.nlm.nih.gov/cgi-bin/sis/htmlgen?LACT      B/P: Data Unavailable, T: Data Unavailable, P: Data Unavailable, R: Data Unavailable 0 lbs 0 oz  There were no vitals taken for this visit., There is no height or weight on file to calculate BMI.  Medications and Vitals not listed above were documented in the cart and reviewed by me.     No current outpatient medications on file.         Cody Moore MD

## 2024-02-01 NOTE — TELEPHONE ENCOUNTER
Action 2/1/24 MV 10.21am   Action Taken Called pt and he stated no outside neuro records nor imaging of the brain and spine.        RECORDS RECEIVED FROM: internal   REASON FOR VISIT: tremors   Date of Appt: 2/16/24   NOTES (FOR ALL VISITS) STATUS DETAILS   OFFICE NOTE from referring provider Internal Dr Varun Salazar @ [facility]:  11/21/23   MEDICATION LIST Internal    IMAGING  (FOR ALL VISITS)     MRI (HEAD, NECK, SPINE) N/A    CT (HEAD, NECK, SPINE) N/A

## 2024-02-16 ENCOUNTER — PRE VISIT (OUTPATIENT)
Dept: NEUROLOGY | Facility: CLINIC | Age: 49
End: 2024-02-16

## 2024-02-16 ENCOUNTER — OFFICE VISIT (OUTPATIENT)
Dept: NEUROLOGY | Facility: CLINIC | Age: 49
End: 2024-02-16
Payer: COMMERCIAL

## 2024-02-16 VITALS
SYSTOLIC BLOOD PRESSURE: 122 MMHG | WEIGHT: 184.4 LBS | DIASTOLIC BLOOD PRESSURE: 83 MMHG | BODY MASS INDEX: 29.63 KG/M2 | HEART RATE: 72 BPM | HEIGHT: 66 IN | OXYGEN SATURATION: 99 %

## 2024-02-16 DIAGNOSIS — G47.9 SLEEP DISORDER: ICD-10-CM

## 2024-02-16 DIAGNOSIS — F51.3 SOMNAMBULISM: ICD-10-CM

## 2024-02-16 DIAGNOSIS — R06.83 SNORES: ICD-10-CM

## 2024-02-16 DIAGNOSIS — R25.1 TREMOR: Primary | ICD-10-CM

## 2024-02-16 PROCEDURE — 99204 OFFICE O/P NEW MOD 45 MIN: CPT | Performed by: PSYCHIATRY & NEUROLOGY

## 2024-02-16 ASSESSMENT — PAIN SCALES - GENERAL: PAINLEVEL: NO PAIN (0)

## 2024-02-16 NOTE — LETTER
2/16/2024       RE: Abdi Mckeon  [address]  [facility] [address]     Dear Colleague,    Thank you for referring your patient, Abdi Mckeon, to the [facility]. Please see a copy of my visit note below.        Need access to records     Tremors - Recs in epic - Scheduled per spouse - Referred by GRADY MATA    [id number] [facility] [address] [phone] ()   [phone] ()     [email or web address]    Elicia Mckeon spouse  [phone]     Need outside records         Again, thank you for allowing me to participate in the care of your patient.      Sincerely,    Cody Moore MD

## 2024-02-16 NOTE — LETTER
2024       RE: Abdi Mckeon  [address]  [facility] [address]     Dear Colleague,    Thank you for referring your patient, Abdi Mckeon, to the [facility]. Please see a copy of my visit note below.          Diagnosis/Summary/Recommendations:    PATIENT: Abdi Mckeon  48 year old male     : 1975    BELLA: 2024    MRN: [id number]    Tremors - Recs in epic - Scheduled per spouse - Referred by GRADY MATA    [address]   [facility] [address] [phone] ()   [phone] ()     [email or web address]    Elicia Mckeon spouse  [phone]       Assessment:  (R25.1) Tremor  (primary encounter diagnosis)  Left hand tremor  Referral from ERIK Mata - 2023 visit     Review of diagnosis    ***    Avoidance of dopamine blockers   ***    Motor complication review   ***    Review of Impulse control disorders   ***    Review of surgical or medication options   ***    Gait/Balance/Falls   ***    Exercise/Therapy performed/offered   ***    Cognitive/Driving   ***    Mood   ***    Hallucinations/delusions   ***    Sleep   ***    Bladder/Renal/Prostate/Gyn/Other   ***    GI/Constipation/GERD   ***    ENDO/Lipid/DM/Bone density/Thyroid  ***    Cardio/heart/Hyper or Hypotensive   ***    Vision/Dry Eyes/Cataracts/Glaucoma/Macular   ***    Heme/Anticoagulation/Antiplatelet/Anemia/Other  ***    ENT/Resp  ***    Skin/Cancer/Seborrhea/other  ***    Musculoskeletal/Pain/Headache  ***    Other:  ***    Medications                                                                                                                                                                              Plan:                  Coding statement:   Medical Decision Making:  #  Chronic progressive medical conditions addressed  - see above --   Review and/or interpretation of unique test or documentation from a provider  outside of neurology ***   Independent historian provided additional details  *** I  Prescription drug management and review of potential side effects and/or monitoring for side effects  -- see above ---  Health impacted by social determinants of health  ***    I have reviewed the note as documented above.  This accurately captures the substance of my conversation with the patient and total time spent preparing for visit, executing visit and completing visit on the day of the visit:  *** minutes.  The portion of this total time included face to face time ***    The longitudinal plan of care for Abdi Mckeon was addressed during this visit. Due to the added complexity in care, I will continue to support Abdi Mckeon in the subsequent management of this condition(s) and with the ongoing continuity of care of this condition(s).      Cody Moore MD     ______________________________________    Last visit date and details:       February 1, 2024  Jackie Bray     MV    2/1/24 10:21 AM  Note  Action 2/1/24 MV 10.21am   Action Taken Called pt and he stated no outside neuro records nor imaging of the brain and spine.               RECORDS RECEIVED FROM: internal   REASON FOR VISIT: tremors   Date of Appt: 2/16/24   NOTES (FOR ALL VISITS) STATUS DETAILS   OFFICE NOTE from referring provider Internal Dr Varun Salazar @ [facility]:  11/21/23   MEDICATION LIST Internal     IMAGING  (FOR ALL VISITS)       MRI (HEAD, NECK, SPINE) N/A     CT (HEAD, NECK, SPINE) N/A                 CM       1. Screen for colon cancer  Z12.11 Colonoscopy Screening  Referral       2. Encounter for routine history and physical exam for male  Z00.00 REVIEW OF HEALTH MAINTENANCE PROTOCOL ORDERS       3. CARDIOVASCULAR SCREENING; LDL GOAL LESS THAN 130  Z13.6 Lipid panel reflex to direct LDL Non-fasting       Lipid panel reflex to direct LDL Non-fasting       4. Screening for prostate cancer  Z12.5 PSA, screen       PSA, screen        5. Tremor of left hand  R25.1 Adult Neurology  Referral               ______________________________________      Patient was asked about 14 Review of systems including changes in vision (dry eyes, double vision), hearing, heart, lungs, musculoskeletal, depression, anxiety, snoring, RBD, insomnia, urinary frequency, urinary urgency, constipation, swallowing problems, hematological, ID, allergies, skin problems: seborrhea, endocrinological: thyroid, diabetes, cholesterol; balance, weight changes, and other neurological problems and these were not significant at this time except for   Patient Active Problem List   Diagnosis    CARDIOVASCULAR SCREENING; LDL GOAL LESS THAN 160          Allergies   Allergen Reactions    No Known Drug Allergy      No past surgical history on file.  No past medical history on file.  Social History     Socioeconomic History    Marital status:      Spouse name: Not on file    Number of children: Not on file    Years of education: Not on file    Highest education level: Not on file   Occupational History    Not on file   Tobacco Use    Smoking status: Never    Smokeless tobacco: Never   Vaping Use    Vaping Use: Never used   Substance and Sexual Activity    Alcohol use: Yes     Comment: rare    Drug use: No    Sexual activity: Yes     Partners: Female     Birth control/protection: Pill   Other Topics Concern    Parent/sibling w/ CABG, MI or angioplasty before 65F 55M? Not Asked   Social History Narrative    Not on file     Social Determinants of Health     Financial Resource Strain: Low Risk  (11/21/2023)    Financial Resource Strain     Within the past 12 months, have you or your family members you live with been unable to get utilities (heat, electricity) when it was really needed?: No   Food Insecurity: Low Risk  (11/21/2023)    Food Insecurity     Within the past 12 months, did you worry that your food would run out before you got money to buy more?: No     Within  the past 12 months, did the food you bought just not last and you didn t have money to get more?: No   Transportation Needs: Low Risk  (2023)    Transportation Needs     Within the past 12 months, has lack of transportation kept you from medical appointments, getting your medicines, non-medical meetings or appointments, work, or from getting things that you need?: No   Physical Activity: Not on file   Stress: Not on file   Social Connections: Not on file   Interpersonal Safety: Low Risk  (2023)    Interpersonal Safety     Do you feel physically and emotionally safe where you currently live?: Yes     Within the past 12 months, have you been hit, slapped, kicked or otherwise physically hurt by someone?: No     Within the past 12 months, have you been humiliated or emotionally abused in other ways by your partner or ex-partner?: No   Housing Stability: Low Risk  (2023)    Housing Stability     Do you have housing? : Yes     Are you worried about losing your housing?: No       Drug and lactation database from the [place] National Library of Medicine:  http://toxnet.nlm.nih.gov/cgi-bin/sis/htmlgen?LACT      B/P: Data Unavailable, T: Data Unavailable, P: Data Unavailable, R: Data Unavailable 0 lbs 0 oz  There were no vitals taken for this visit., There is no height or weight on file to calculate BMI.  Medications and Vitals not listed above were documented in the cart and reviewed by me.     No current outpatient medications on file.         Cody Moore MD            Diagnosis/Summary/Recommendations:    PATIENT: Abdi Mckeon  48 year old male     : 1975    BELLA: 2024    MRN: [id number]    Tremors - Recs in epic - Scheduled per spouse - Referred by GRADY MATA    [address]   [facility] [address] [phone] (M)   [phone] (H)     [email or web address]    Elicia Mckeon spouse  [phone]     Assessment:  (R25.1) Tremor  (primary encounter diagnosis)  Left hand  tremor  Referral from ERIK Salazar - 11/21/2023 visit   Right handed  No family history of tremor     Review of diagnosis    Tremor    Avoidance of dopamine blockers   Not taking medications      Motor complication review   N/a    Review of Impulse control disorders   N/a    Review of surgical or medication options   reviewed    Gait/Balance/Falls   No falls    Exercise/Therapy performed/offered   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     Concussion from fall - 5  years ago   Had persistent vertigo for 2 weeks.   No profound loss of consciousness  But laid on the ground and could not open his eyes for a while  Seeing [employer]    Mood   [employer] reserve   to chapis  2 daughters  No mood changes  Lives in [place]  Wife Works in [employer] in [place]    Hallucinations/delusions   no    Sleep   No problems  Snores  Talks in his sleep - long standing since little  Somnambulism   Sleeps through the night    Bladder/Renal/Prostate/Gyn/Other   Denies bladder issues  No renal stones  No nocturia    GI/Constipation/GERD   denies    ENDO/Lipid/DM/Bone density/Thyroid  denies    Cardio/heart/Hyper or Hypotensive   No problems    Vision/Dry Eyes/Cataracts/Glaucoma/Macular   May have some eye issues and may need cheaters - has an appointment tomorrow     Heme/Anticoagulation/Antiplatelet/Anemia/Other  Not taking aspirin    ENT/Resp  Denies  No loss of smell or taste  May have had covid early feb 2020  Was tired and lasted for 4 days and would go home at work and sit in chair and fall asleep  Had sweat and chills    Skin/Cancer/Seborrhea/other  Poison ivy when younger     Musculoskeletal/Pain/Headache  Low  back pain  Does not take anything   chiropractor    Other:      Medications                                                                                                                                                                               Examination   Normal voice volume  Normal facial expression  No head tremor  No tongue or vocal tremor  Normal tone and power  No bradykinesia or freezing  No resting tremor  He has a bilateral left > right postural and action tremor  May be worse on finger to nose to a proximal target  Has leg tremor with extension  Has normal gait, arm swing, station and stability.     Plan:    Essential tremor   Discussed alternative diagnosis and testing that is done (datscan, brain mri, genetics)  Presently would not recommend tests or treatment  Additional details are below   Granted access to his wife Elicia hull    Most likely this is essential tremor.   It is possible that there are other causes for your tremor.  An overactive thyroid can cause tremor.  Treatment for essential tremor   1. Beta-blockers, e.g., metoprolol, propranolol and nadolol are beta-blockers that can be given every day or used as needed for tremor.  A common regimen is to give someone 10-20 mg of propranolol which they take 30 - 60 minutes before an activity, e.g.  A talk or wedding. Side effects of these medications primarily when used in much higher doses can include light-headedness, low blood pressure, worsened asthma (if you have this) as well as an assortment of other items. Please consult with your pharmacist and primary doctor if you are not sure that it is safe if you can take these medications.  2. The anticonvulsant mysoline (primidone) is an effective medication but it can be sedating and cause worsen memory, balance, etc. I would cautious about using this medication. If it is tried it should be started at a low dose and adjust upwards:  A. Really slow titration schedule: Use the liquid formulation and start at 10mg and increase each week till ultimately get to 50mg daily and then would use 50mg tabs to titrate up to 100mg then 150mg daily with weekly dose increases by 50mg.  B. Standard titration schedule:  start at 1/2 50mg a day and then weekly increase by 25mg till daily dose is 150mg.   Monitoring for blood or liver abnormalities may be needed with this medication, which can also affect other medications that you are taking. Please consult with your pharmacist and primary doctor if you are not sure that it is safe if you can take these medication.  3. Other medications: gabapentin (neurontin), topiramate (topamax), levetiracetam (keppra) and benzodiazepines (e.g., valium/diazepam, ativan/lorazepam,  klonopin/clonazepam) have all been used to limited benefit or have too many side effects, eg. Valium would make you sleepy and unsteady. Topiramate may help but can cause memory difficulties and kidney stones. Please consult with your pharmacist and primary doctor if you are not sure that it is safe if you can take these medications.  4. Brain surgery - deep brain stimulation (DBS) has been used to treat tremor for some individuals. Prior to surgery memory testing is required wit Dr. Sera Jay or another neuropsychologist.  You will also need to undergo a brain MRI scan (if you are able to safely undergo one) and have your tremor characterized with a tremor rating scale. We then meet with our surgical group and if appears that you are a good surgical candidate and are still interested in surgery you will meet with our neurosurgeon to discuss this treatment further.      Coding statement:   Medical Decision Making:  #  Chronic progressive medical conditions addressed  - see above --   Review and/or interpretation of unique test or documentation from a provider outside of neurology no   Independent historian provided additional details  no I  Prescription drug management and review of potential side effects and/or monitoring for side effects  -- see above ---  Health impacted by social determinants of health  no    I have reviewed the note as documented above.  This accurately captures the substance of my conversation  with the patient and total time spent preparing for visit, executing visit and completing visit on the day of the visit:  45 minutes.  The portion of this total time included face to face time 42 minutes     The longitudinal plan of care for Abdi Mckeon was addressed during this visit. Due to the added complexity in care, I will continue to support Abdi Mckeon in the subsequent management of this condition(s) and with the ongoing continuity of care of this condition(s).      Cody Moore MD     ______________________________________    Last visit date and details:       February 1, 2024  Jackie Bray     MV    2/1/24 10:21 AM  Note  Action 2/1/24 MV 10.21am   Action Taken Called pt and he stated no outside neuro records nor imaging of the brain and spine.               RECORDS RECEIVED FROM: internal   REASON FOR VISIT: tremors   Date of Appt: 2/16/24   NOTES (FOR ALL VISITS) STATUS DETAILS   OFFICE NOTE from referring provider Internal Dr Varun Salazar @ [facility]:  11/21/23   MEDICATION LIST Internal     IMAGING  (FOR ALL VISITS)       MRI (HEAD, NECK, SPINE) N/A     CT (HEAD, NECK, SPINE) N/A                 CM       1. Screen for colon cancer  Z12.11 Colonoscopy Screening  Referral       2. Encounter for routine history and physical exam for male  Z00.00 REVIEW OF HEALTH MAINTENANCE PROTOCOL ORDERS       3. CARDIOVASCULAR SCREENING; LDL GOAL LESS THAN 130  Z13.6 Lipid panel reflex to direct LDL Non-fasting       Lipid panel reflex to direct LDL Non-fasting       4. Screening for prostate cancer  Z12.5 PSA, screen       PSA, screen       5. Tremor of left hand  R25.1 Adult Neurology  Referral               ______________________________________      Patient was asked about 14 Review of systems including changes in vision (dry eyes, double vision), hearing, heart, lungs, musculoskeletal, depression, anxiety, snoring, RBD, insomnia, urinary frequency, urinary urgency,  constipation, swallowing problems, hematological, ID, allergies, skin problems: seborrhea, endocrinological: thyroid, diabetes, cholesterol; balance, weight changes, and other neurological problems and these were not significant at this time except for   Patient Active Problem List   Diagnosis    CARDIOVASCULAR SCREENING; LDL GOAL LESS THAN 160          Allergies   Allergen Reactions    No Known Drug Allergy      No past surgical history on file.  No past medical history on file.  Social History     Socioeconomic History    Marital status:      Spouse name: Not on file    Number of children: Not on file    Years of education: Not on file    Highest education level: Not on file   Occupational History    Not on file   Tobacco Use    Smoking status: Never    Smokeless tobacco: Never   Vaping Use    Vaping Use: Never used   Substance and Sexual Activity    Alcohol use: Yes     Comment: rare    Drug use: No    Sexual activity: Yes     Partners: Female     Birth control/protection: Pill   Other Topics Concern    Parent/sibling w/ CABG, MI or angioplasty before 65F 55M? Not Asked   Social History Narrative    Not on file     Social Determinants of Health     Financial Resource Strain: Low Risk  (11/21/2023)    Financial Resource Strain     Within the past 12 months, have you or your family members you live with been unable to get utilities (heat, electricity) when it was really needed?: No   Food Insecurity: Low Risk  (11/21/2023)    Food Insecurity     Within the past 12 months, did you worry that your food would run out before you got money to buy more?: No     Within the past 12 months, did the food you bought just not last and you didn t have money to get more?: No   Transportation Needs: Low Risk  (11/21/2023)    Transportation Needs     Within the past 12 months, has lack of transportation kept you from medical appointments, getting your medicines, non-medical meetings or appointments, work, or from getting  things that you need?: No   Physical Activity: Not on file   Stress: Not on file   Social Connections: Not on file   Interpersonal Safety: Low Risk  (11/21/2023)    Interpersonal Safety     Do you feel physically and emotionally safe where you currently live?: Yes     Within the past 12 months, have you been hit, slapped, kicked or otherwise physically hurt by someone?: No     Within the past 12 months, have you been humiliated or emotionally abused in other ways by your partner or ex-partner?: No   Housing Stability: Low Risk  (11/21/2023)    Housing Stability     Do you have housing? : Yes     Are you worried about losing your housing?: No       Drug and lactation database from the [place] National Library of Medicine:  http://toxnet.nlm.nih.gov/cgi-bin/sis/htmlgen?LACT      B/P: Data Unavailable, T: Data Unavailable, P: Data Unavailable, R: Data Unavailable 0 lbs 0 oz  There were no vitals taken for this visit., There is no height or weight on file to calculate BMI.  Medications and Vitals not listed above were documented in the cart and reviewed by me.     No current outpatient medications on file.         Cody Moore MD      Again, thank you for allowing me to participate in the care of your patient.      Sincerely,    Cody Moore MD

## 2024-02-16 NOTE — PATIENT INSTRUCTIONS
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     Concussion from fall - 5  years ago   Had persistent vertigo for 2 weeks.   No profound loss of consciousness  But laid on the ground and could not open his eyes for a while  Seeing stars    Mood   [employer]   to [employer]  2 daughters  No mood changes  Lives in [place]  Wife Works in [employer] in [place]    Hallucinations/delusions   no    Sleep   No problems  Snores  Talks in his sleep - long standing since little  Somnambulism   Sleeps through the night    Bladder/Renal/Prostate/Gyn/Other   Denies bladder issues  No renal stones  No nocturia    GI/Constipation/GERD   denies    ENDO/Lipid/DM/Bone density/Thyroid  denies    Cardio/heart/Hyper or Hypotensive   No problems    Vision/Dry Eyes/Cataracts/Glaucoma/Macular   May have some eye issues and may need cheaters - has an appointment tomorrow     Heme/Anticoagulation/Antiplatelet/Anemia/Other  Not taking aspirin    ENT/Resp  Denies  No loss of smell or taste  May have had covid early feb 2020  Was tired and lasted for 4 days and would go home at work and sit in chair and fall asleep  Had sweat and chills    Skin/Cancer/Seborrhea/other  Poison ivy when younger     Musculoskeletal/Pain/Headache  Low  back pain  Does not take anything   chiropractor    Other:      Medications                                                                                                                                                                               Examination   Normal voice volume  Normal facial expression  No head tremor  No tongue or vocal tremor  Normal tone and power  No bradykinesia or freezing  No resting tremor  He has a bilateral left > right postural and action tremor  May be worse on finger to nose to a proximal target  Has leg tremor with extension  Has normal gait, arm swing, station and stability.     Plan:    Essential tremor   Discussed alternative diagnosis and testing that is done (datscan, brain mri, genetics)  Presently would not recommend tests or treatment  Additional details are below   Granted access to his wife Elicia hull    Most likely this is essential tremor.   It is possible that there are other causes for your tremor.  An overactive thyroid can cause tremor.  Treatment for essential tremor   1. Beta-blockers, e.g., metoprolol, propranolol and nadolol are beta-blockers that can be given every day or used as needed for tremor.  A common regimen is to give someone 10-20 mg of propranolol which they take 30 - 60 minutes before an activity, e.g.  A talk or wedding. Side effects of these medications primarily when used in much higher doses can include light-headedness, low blood pressure, worsened asthma (if you have this) as well as an assortment of other items. Please consult with your pharmacist and primary doctor if you are not sure that it is safe if you can take these medications.  2. The anticonvulsant mysoline (primidone) is an effective medication but it can be sedating and cause worsen memory, balance, etc. I would cautious about using this medication. If it is tried it should be started at a low dose and adjust upwards:  A. Really slow titration schedule: Use the liquid formulation and start at 10mg and increase each week till ultimately get to 50mg daily and then would use 50mg tabs to titrate up to 100mg then 150mg daily  with weekly dose increases by 50mg.  B. Standard titration schedule: start at 1/2 50mg a day and then weekly increase by 25mg till daily dose is 150mg.   Monitoring for blood or liver abnormalities may be needed with this medication, which can also affect other medications that you are taking. Please consult with your pharmacist and primary doctor if you are not sure that it is safe if you can take these medication.  3. Other medications: gabapentin (neurontin), topiramate (topamax), levetiracetam (keppra) and benzodiazepines (e.g., valium/diazepam, ativan/lorazepam,  klonopin/clonazepam) have all been used to limited benefit or have too many side effects, eg. Valium would make you sleepy and unsteady. Topiramate may help but can cause memory difficulties and kidney stones. Please consult with your pharmacist and primary doctor if you are not sure that it is safe if you can take these medications.  4. Brain surgery - deep brain stimulation (DBS) has been used to treat tremor for some individuals. Prior to surgery memory testing is required wit Dr. Sera Jay or another neuropsychologist.  You will also need to undergo a brain MRI scan (if you are able to safely undergo one) and have your tremor characterized with a tremor rating scale. We then meet with our surgical group and if appears that you are a good surgical candidate and are still interested in surgery you will meet with our neurosurgeon to discuss this treatment further.

## 2024-03-04 ENCOUNTER — TELEPHONE (OUTPATIENT)
Dept: GASTROENTEROLOGY | Facility: CLINIC | Age: 49
End: 2024-03-04
Payer: COMMERCIAL

## 2024-03-04 NOTE — TELEPHONE ENCOUNTER
Caller: Abdi's wife    Reason for Reschedule/Cancellation   (please be detailed, any staff messages or encounters to note?): patient will be out of town for work.      Prior to reschedule please review:  Ordering Provider:     GRADY MATA     Sedation Determined: moderate  Does patient have any ASC Exclusions, please identify?: n      Notes on Cancelled Procedure:  Procedure: Lower Endoscopy [Colonoscopy]   Date: 03/11/2024  Location: [facility]; [address] , [place] [address]  Surgeon: Favian      Rescheduled: Yes,   Procedure: Lower Endoscopy [Colonoscopy]    Date: 05/03/2024   Location: [facility]; [address] [place] [address]   Surgeon: Isabel   Sedation Level Scheduled  MAC,  Reason for Sedation Level per location   Instructions updated and sent: y    Does patient need PAC or Pre -Op Rescheduled? : no       Did you cancel or rescheduled an EUS procedure? No.
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
## 2024-09-08 NOTE — H&P
[facility] History and Physical    Abdi Mckeon MRN# [id number]   Age: 48 year old YOB: 1975     Date of Admission:  (Not on file)    Home clinic: [facility]  Primary care provider: No Ref-Primary, Physician          Impression and Plan:   Impression:   Screen for colon cancer [Z12.11]  No prior      Plan:   Proceed to Colonoscopy as planned.  The procedure, risks(bleeding, perforation), benefits and alternatives were discussed and the patient agrees to proceed. Cleared for Anesthesia             Chief Complaint:   Screen for colon cancer [Z12.11]    History is obtained from the patient          History of Present Illness:   This 48 year old male is being seen at this time for evaluation for colonoscopy.  No complaints or family hx           Past Medical History:     Past Medical History:   Diagnosis Date    Snores 02/16/2024    Somnambulism 02/16/2024    talks in his sleep 02/16/2024            Past Surgical History:   No past surgical history on file.         Social History:     Social History     Tobacco Use    Smoking status: Never    Smokeless tobacco: Never   Substance Use Topics    Alcohol use: Yes     Comment: rare            Family History:     Family History   Problem Relation Age of Onset    Other - See Comments Mother         no details    Hyperlipidemia Father     Other - See Comments Father         no contact    Other - See Comments Maternal Half-Sister     Other - See Comments Paternal Half-Sister     Other - See Comments Paternal Half-Brother     Cerebrovascular Disease Maternal Grandmother     Prostate Cancer Paternal Grandfather     Other - See Comments Daughter     Other - See Comments Daughter     Tremor Maternal Aunt         `    Diabetes No family hx of     Coronary Artery Disease No family hx of     Hypertension No family hx of             Immunizations:     VACCINE/DOSE   Diptheria   DPT   DTAP   HBIG   Hepatitis A   Hepatitis B   HIB    Influenza   Measles   Meningococcal   MMR   Mumps   Pneumococcal   Polio   Rubella   Small Pox   TDAP   Varicella   Zoster            Allergies:     Allergies   Allergen Reactions    No Known Drug Allergy             Medications:     No current facility-administered medications for this encounter.     No current outpatient medications on file.             Review of Systems:   The review of systems was positive for the following findings.  None.  The remainder of the review of systems was unremarkable.          Physical Exam:   All vitals have been reviewed  There were no vitals taken for this visit.  No intake or output data in the 24 hours ending 09/08/24 1039  SHEENT examination revealed NC/AT, EOMI.  Examination of the chest revealed CTA.  Examination of the heart revealed RRR.  Examination of the abdomen revealed Soft, non tender.  The neuromuscular examination was NL.          Data:   All laboratory data reviewed  No results found for any visits on 09/09/24.  -     Ab Hall MD, FACS

## 2024-09-09 ENCOUNTER — HOSPITAL ENCOUNTER (OUTPATIENT)
Facility: CLINIC | Age: 49
Discharge: HOME | End: 2024-09-09
Attending: SPECIALIST | Admitting: SPECIALIST
Payer: COMMERCIAL

## 2024-09-09 ENCOUNTER — ANESTHESIA (OUTPATIENT)
Dept: GASTROENTEROLOGY | Facility: CLINIC | Age: 49
End: 2024-09-09
Payer: COMMERCIAL

## 2024-09-09 ENCOUNTER — ANESTHESIA EVENT (OUTPATIENT)
Dept: GASTROENTEROLOGY | Facility: CLINIC | Age: 49
End: 2024-09-09
Payer: COMMERCIAL

## 2024-09-09 VITALS
SYSTOLIC BLOOD PRESSURE: 117 MMHG | TEMPERATURE: 98 F | HEART RATE: 59 BPM | BODY MASS INDEX: 29.62 KG/M2 | WEIGHT: 184.3 LBS | RESPIRATION RATE: 16 BRPM | DIASTOLIC BLOOD PRESSURE: 81 MMHG | OXYGEN SATURATION: 93 % | HEIGHT: 66 IN

## 2024-09-09 LAB — COLONOSCOPY: NORMAL

## 2024-09-09 PROCEDURE — 45378 DIAGNOSTIC COLONOSCOPY: CPT | Performed by: SPECIALIST

## 2024-09-09 PROCEDURE — G0121 COLON CA SCRN NOT HI RSK IND: HCPCS | Performed by: SPECIALIST

## 2024-09-09 PROCEDURE — 250N000009 HC RX 250: Performed by: SPECIALIST

## 2024-09-09 PROCEDURE — 250N000011 HC RX IP 250 OP 636: Performed by: NURSE ANESTHETIST, CERTIFIED REGISTERED

## 2024-09-09 PROCEDURE — 250N000009 HC RX 250: Performed by: NURSE ANESTHETIST, CERTIFIED REGISTERED

## 2024-09-09 PROCEDURE — 258N000003 HC RX IP 258 OP 636: Performed by: NURSE ANESTHETIST, CERTIFIED REGISTERED

## 2024-09-09 PROCEDURE — 370N000017 HC ANESTHESIA TECHNICAL FEE, PER MIN: Performed by: SPECIALIST

## 2024-09-09 RX ORDER — SODIUM CHLORIDE, SODIUM LACTATE, POTASSIUM CHLORIDE, CALCIUM CHLORIDE 600; 310; 30; 20 MG/100ML; MG/100ML; MG/100ML; MG/100ML
INJECTION, SOLUTION INTRAVENOUS CONTINUOUS
Status: DISCONTINUED | OUTPATIENT
Start: 2024-09-09 | End: 2024-09-09 | Stop reason: HOSPADM

## 2024-09-09 RX ORDER — LIDOCAINE 40 MG/G
CREAM TOPICAL
Status: DISCONTINUED | OUTPATIENT
Start: 2024-09-09 | End: 2024-09-09 | Stop reason: HOSPADM

## 2024-09-09 RX ORDER — PROPOFOL 10 MG/ML
INJECTION, EMULSION INTRAVENOUS PRN
Status: DISCONTINUED | OUTPATIENT
Start: 2024-09-09 | End: 2024-09-09

## 2024-09-09 RX ORDER — LIDOCAINE HYDROCHLORIDE 20 MG/ML
INJECTION, SOLUTION INFILTRATION; PERINEURAL PRN
Status: DISCONTINUED | OUTPATIENT
Start: 2024-09-09 | End: 2024-09-09

## 2024-09-09 RX ORDER — PROPOFOL 10 MG/ML
INJECTION, EMULSION INTRAVENOUS CONTINUOUS PRN
Status: DISCONTINUED | OUTPATIENT
Start: 2024-09-09 | End: 2024-09-09

## 2024-09-09 RX ORDER — DEXAMETHASONE SODIUM PHOSPHATE 10 MG/ML
4 INJECTION, SOLUTION INTRAMUSCULAR; INTRAVENOUS
Status: DISCONTINUED | OUTPATIENT
Start: 2024-09-09 | End: 2024-09-09 | Stop reason: HOSPADM

## 2024-09-09 RX ORDER — NALOXONE HYDROCHLORIDE 0.4 MG/ML
0.1 INJECTION, SOLUTION INTRAMUSCULAR; INTRAVENOUS; SUBCUTANEOUS
Status: DISCONTINUED | OUTPATIENT
Start: 2024-09-09 | End: 2024-09-09 | Stop reason: HOSPADM

## 2024-09-09 RX ORDER — ONDANSETRON 2 MG/ML
4 INJECTION INTRAMUSCULAR; INTRAVENOUS EVERY 30 MIN PRN
Status: DISCONTINUED | OUTPATIENT
Start: 2024-09-09 | End: 2024-09-09 | Stop reason: HOSPADM

## 2024-09-09 RX ORDER — ONDANSETRON 4 MG/1
4 TABLET, ORALLY DISINTEGRATING ORAL EVERY 30 MIN PRN
Status: DISCONTINUED | OUTPATIENT
Start: 2024-09-09 | End: 2024-09-09 | Stop reason: HOSPADM

## 2024-09-09 RX ADMIN — LIDOCAINE HYDROCHLORIDE 25 MG: 20 INJECTION, SOLUTION INFILTRATION; PERINEURAL at 08:55

## 2024-09-09 RX ADMIN — PROPOFOL 80 MG: 10 INJECTION, EMULSION INTRAVENOUS at 08:55

## 2024-09-09 RX ADMIN — PROPOFOL 150 MCG/KG/MIN: 10 INJECTION, EMULSION INTRAVENOUS at 08:55

## 2024-09-09 RX ADMIN — SODIUM CHLORIDE, POTASSIUM CHLORIDE, SODIUM LACTATE AND CALCIUM CHLORIDE: 600; 310; 30; 20 INJECTION, SOLUTION INTRAVENOUS at 08:50

## 2024-09-09 RX ADMIN — LIDOCAINE HYDROCHLORIDE 0.1 ML: 10 INJECTION, SOLUTION EPIDURAL; INFILTRATION; INTRACAUDAL; PERINEURAL at 08:31

## 2024-09-09 ASSESSMENT — ACTIVITIES OF DAILY LIVING (ADL)
ADLS_ACUITY_SCORE: 35

## 2024-09-09 NOTE — DISCHARGE INSTRUCTIONS
[facility]    Home Care Following Endoscopy          Activity:    You have just undergone an endoscopic procedure performed with sedation.  Do not work or operate machinery (including a car) for at least 12 hours.      Diet:    Return to the diet you were on before your procedure but eat lightly for the first 12-24 hours.  Drink plenty of water.  Resume any regular medications unless otherwise advised by your physician.  Please begin any new medication prescribed as a result of your procedure as directed by your physician.      Pain:  You may take Tylenol as needed for pain.    Expected Recovery:    You can expect some mild abdominal fullness and/or discomfort due to the air used to inflate your intestinal tract. I encourage you to walk and attempt to pass this air as soon as possible.    Call Your Physician if You Have:    After Colonoscopy:  Worsening persisting abdominal pain which is worse with activity.  Fevers (>101 degrees F), chills or shakes.  Passage of continued blood with bowel movements.     Any questions or concerns about your recovery, please call [phone] or after hours [phone] ([phone]) Nurse Advice Line.

## 2024-09-09 NOTE — ANESTHESIA PREPROCEDURE EVALUATION
Anesthesia Pre-Procedure Evaluation    Patient: Abdi Mckeon   MRN: [id number] : 1975        Procedure : Procedure(s):  Colonoscopy          Past Medical History:   Diagnosis Date    Snores 2024    Somnambulism 2024    talks in his sleep 2024      No past surgical history on file.   Allergies   Allergen Reactions    No Known Drug Allergy       Social History     Tobacco Use    Smoking status: Never    Smokeless tobacco: Never   Substance Use Topics    Alcohol use: Yes     Comment: rare      Wt Readings from Last 1 Encounters:   24 83.6 kg (184 lb 6.4 oz)        Anesthesia Evaluation            ROS/MED HX  ENT/Pulmonary:     (+)     NACHO risk factors, snores loudly,   daytime somnolence,                               Neurologic:  - neg neurologic ROS     Cardiovascular:  - neg cardiovascular ROS     METS/Exercise Tolerance:     Hematologic:  - neg hematologic  ROS     Musculoskeletal:  - neg musculoskeletal ROS     GI/Hepatic:     (+)        bowel prep,            Renal/Genitourinary:  - neg Renal ROS     Endo:  - neg endo ROS     Psychiatric/Substance Use:  - neg psychiatric ROS     Infectious Disease:  - neg infectious disease ROS     Malignancy:  - neg malignancy ROS     Other:  - neg other ROS          Physical Exam    Airway  airway exam normal      Mallampati: I   TM distance: > 3 FB   Neck ROM: full   Mouth opening: > 3 cm    Respiratory Devices and Support         Dental           Cardiovascular   cardiovascular exam normal       Rhythm and rate: regular and normal     Pulmonary   pulmonary exam normal        breath sounds clear to auscultation           OUTSIDE LABS:  CBC:   Lab Results   Component Value Date    WBC 6.4 2021    WBC 6.4 2010    HGB 17.6 2021    HGB 16.7 2010    HCT 49.6 2021    HCT 46.1 2010     2021     2010     BMP:   Lab Results   Component Value Date     2021    POTASSIUM 4.3  "11/26/2021    CHLORIDE 111 (H) 11/26/2021    CO2 26 11/26/2021    BUN 10 11/26/2021    CR 0.88 11/26/2021     (H) 11/26/2021    GLC 95 07/07/2018     COAGS: No results found for: \"PTT\", \"INR\", \"FIBR\"  POC: No results found for: \"BGM\", \"HCG\", \"HCGS\"  HEPATIC:   Lab Results   Component Value Date    ALBUMIN 3.9 11/26/2021    PROTTOTAL 6.8 11/26/2021    ALT 26 11/26/2021    AST 10 11/26/2021    ALKPHOS 69 11/26/2021    BILITOTAL 0.6 11/26/2021     OTHER:   Lab Results   Component Value Date    PH 5.0 11/23/2001    PREMA 8.6 11/26/2021    TSH 1.48 11/27/2006       Anesthesia Plan    ASA Status:  2    NPO Status:  NPO Appropriate    Anesthesia Type: MAC.     - Reason for MAC: straight local not clinically adequate   Induction: Intravenous, Propofol.   Maintenance: TIVA.        Consents       Use of blood products discussed: No .     Postoperative Care       PONV prophylaxis: Background Propofol Infusion     Comments:    Other Comments: The risks and benefits of anesthesia, and the alternatives where applicable, have been discussed with the patient, and they wish to proceed.              Silvestre Shook, APRN CRNA    I have reviewed the pertinent notes and labs in the chart from the past 30 days and (re)examined the patient.  Any updates or changes from those notes are reflected in this note.                  "

## 2024-09-09 NOTE — ANESTHESIA POSTPROCEDURE EVALUATION
Patient: Abdi Mckeon    Procedure: Procedure(s):  Colonoscopy       Anesthesia Type:  MAC    Note:  Disposition: Outpatient   Postop Pain Control: Uneventful            Sign Out: Well controlled pain   PONV: No   Neuro/Psych: Uneventful            Sign Out: Acceptable/Baseline neuro status   Airway/Respiratory: Uneventful            Sign Out: Acceptable/Baseline resp. status   CV/Hemodynamics: Uneventful            Sign Out: Acceptable CV status   Other NRE: NONE   DID A NON-ROUTINE EVENT OCCUR? No    Event details/Postop Comments:  Pt was happy with anesthesia care.  No complications.  I will follow up with the pt if needed.           Last vitals:  Vitals Value Taken Time   /77 09/09/24 0911   Temp     Pulse 66 09/09/24 0911   Resp     SpO2 93 % 09/09/24 0920   Vitals shown include unfiled device data.    Electronically Signed By: BRIDGET Garcia CRNA  September 9, 2024  9:21 AM

## 2024-09-09 NOTE — ANESTHESIA CARE TRANSFER NOTE
Patient: Abdi Mckeon    Procedure: Procedure(s):  Colonoscopy       Diagnosis: Screen for colon cancer [Z12.11]  Diagnosis Additional Information: No value filed.    Anesthesia Type:   MAC     Note:    Oropharynx: oropharynx clear of all foreign objects and spontaneously breathing  Level of Consciousness: drowsy  Oxygen Supplementation: face mask    Independent Airway: airway patency satisfactory and stable  Dentition: dentition unchanged  Vital Signs Stable: post-procedure vital signs reviewed and stable  Report to RN Given: handoff report given  Patient transferred to: Phase II    Handoff Report: Identifed the Patient, Identified the Reponsible Provider, Reviewed the pertinent medical history, Discussed the surgical course, Reviewed Intra-OP anesthesia mangement and issues during anesthesia, Set expectations for post-procedure period and Allowed opportunity for questions and acknowledgement of understanding      Vitals:  Vitals Value Taken Time   BP     Temp     Pulse     Resp     SpO2         Electronically Signed By: BRIDGET Garcia CRNA  September 9, 2024  9:09 AM

## 2024-12-30 SDOH — HEALTH STABILITY: PHYSICAL HEALTH: ON AVERAGE, HOW MANY MINUTES DO YOU ENGAGE IN EXERCISE AT THIS LEVEL?: 150+ MIN

## 2024-12-30 SDOH — HEALTH STABILITY: PHYSICAL HEALTH: ON AVERAGE, HOW MANY DAYS PER WEEK DO YOU ENGAGE IN MODERATE TO STRENUOUS EXERCISE (LIKE A BRISK WALK)?: 5 DAYS

## 2024-12-30 ASSESSMENT — SOCIAL DETERMINANTS OF HEALTH (SDOH): HOW OFTEN DO YOU GET TOGETHER WITH FRIENDS OR RELATIVES?: ONCE A WEEK

## 2024-12-31 ENCOUNTER — OFFICE VISIT (OUTPATIENT)
Dept: FAMILY MEDICINE | Facility: OTHER | Age: 49
End: 2024-12-31
Payer: COMMERCIAL

## 2024-12-31 VITALS
RESPIRATION RATE: 11 BRPM | TEMPERATURE: 98.3 F | HEART RATE: 55 BPM | SYSTOLIC BLOOD PRESSURE: 113 MMHG | DIASTOLIC BLOOD PRESSURE: 75 MMHG | OXYGEN SATURATION: 97 % | WEIGHT: 180.5 LBS | HEIGHT: 66 IN | BODY MASS INDEX: 29.01 KG/M2

## 2024-12-31 DIAGNOSIS — Z00.00 ROUTINE GENERAL MEDICAL EXAMINATION AT A HEALTH CARE FACILITY: Primary | ICD-10-CM

## 2024-12-31 DIAGNOSIS — Z12.5 SCREENING FOR PROSTATE CANCER: ICD-10-CM

## 2024-12-31 PROCEDURE — 99396 PREV VISIT EST AGE 40-64: CPT | Performed by: PHYSICIAN ASSISTANT

## 2024-12-31 ASSESSMENT — PAIN SCALES - GENERAL: PAINLEVEL_OUTOF10: NO PAIN (0)

## 2024-12-31 NOTE — PROGRESS NOTES
"Preventive Care Visit  [facility]  Sylvester Joseph PA-C, Family Medicine  Dec 31, 2024      Assessment & Plan     Routine general medical examination at a health care facility  Patient is a 49 year old male who is a former patient of Dr. Salazar. He is here today for annual checkup. He informs me that he enjoys outdoor activities such as bird hunting and recently took a trip to [place], this past fall, to hunt. He also trains dogs for hunting. He was concerned about possible exposure to parasites, worms, etc since he is around these dogs. We reviewed the clinical manifestations which would be expected if a parasite were to infect the body. Denies symptoms at this time. Can continue to monitor this at future visits. Reviewed healthy lifestyle recommendations with the patient. Reviewed health maintenance and updated per the patient's preferences.  - Basic metabolic panel  (Ca, Cl, CO2, Creat, Gluc, K, Na, BUN); Future    Screening for prostate cancer  No symptoms or history of prostate cancer in family or self. Will update him with the results when they become available.   - PSA, screen; Future    Patient has been advised of split billing requirements and indicates understanding: Yes        BMI  Estimated body mass index is 29.15 kg/m  as calculated from the following:    Height as of this encounter: 1.676 m (5' 5.98\").    Weight as of this encounter: 81.9 kg (180 lb 8 oz).   Weight management plan: Discussed healthy diet and exercise guidelines    Counseling  Appropriate preventive services were addressed with this patient via screening, questionnaire, or discussion as appropriate for fall prevention, nutrition, physical activity, Tobacco-use cessation, social engagement, weight loss and cognition.  Checklist reviewing preventive services available has been given to the patient.  Reviewed patient's diet, addressing concerns and/or questions.     Zohreh Patton is a 49 year old, presenting " for the following:  Physical        12/31/2024     2:37 PM   Additional Questions   Roomed by saroj   Accompanied by self          HPI    Health Care Directive  Patient does not have a Health Care Directive: Discussed advance care planning with patient; however, patient declined at this time.      12/30/2024   General Health   How would you rate your overall physical health? Good   Feel stress (tense, anxious, or unable to sleep) Not at all         12/30/2024   Nutrition   Three or more servings of calcium each day? Yes   Diet: Regular (no restrictions)   How many servings of fruit and vegetables per day? (!) 0-1   How many sweetened beverages each day? (!) 2         12/30/2024   Exercise   Days per week of moderate/strenous exercise 5 days   Average minutes spent exercising at this level 150+ min         12/30/2024   Social Factors   Frequency of gathering with friends or relatives Once a week   Worry food won't last until get money to buy more No   Food not last or not have enough money for food? No   Do you have housing? (Housing is defined as stable permanent housing and does not include staying ouside in a car, in a tent, in an abandoned building, in an overnight shelter, or couch-surfing.) Yes   Are you worried about losing your housing? No   Lack of transportation? No   Unable to get utilities (heat,electricity)? No         12/30/2024   Dental   Dentist two times every year? Yes         12/30/2024   TB Screening   Were you born outside of the [place]? No         12/30/2024   Substance Use   Alcohol more than 3/day or more than 7/wk No   Do you use any other substances recreationally? No     Social History     Tobacco Use    Smoking status: Never    Smokeless tobacco: Never   Vaping Use    Vaping status: Never Used   Substance Use Topics    Alcohol use: Not Currently     Comment: Very rare. A couple beers a year.    Drug use: No         12/30/2024   STI Screening   New sexual partner(s) since last STI/HIV test? 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     Objective    Exam  /75   Pulse 55   Temp 98.3  F (36.8  C) (Temporal)   Resp 11   Ht 1.676 m (5' 5.98\")   Wt 81.9 kg (180 lb 8 oz)   SpO2 97%   BMI 29.15 kg/m     Estimated body mass index is 29.15 kg/m  as calculated from the following:    Height as of this encounter: 1.676 m (5' 5.98\").    Weight as of this encounter: 81.9 kg (180 lb 8 oz).    Physical Exam  GENERAL: alert and no distress  EYES: Eyes grossly normal to inspection, PERRL and conjunctivae and sclerae normal  HENT: ear canals and TM's normal, nose and mouth without ulcers or lesions  NECK: no adenopathy, no asymmetry, masses, or scars  RESP: lungs clear to auscultation - no rales, rhonchi or wheezes  CV: regular rate and rhythm, normal S1 S2, no S3 or S4, no murmur, click or rub, no peripheral edema  ABDOMEN: soft, nontender, no hepatosplenomegaly, no masses and bowel sounds normal  MS: no gross musculoskeletal defects noted, no edema  NEURO: Normal strength and tone, mentation intact and speech normal  PSYCH: mentation appears normal, affect normal/bright        Signed Electronically by: Sylvester Joseph PA-C    "

## 2024-12-31 NOTE — PATIENT INSTRUCTIONS
Patient Education   Preventive Care Advice   This is general advice given by our system to help you stay healthy. However, your care team may have specific advice just for you. Please talk to your care team about your preventive care needs.  Nutrition  Eat 5 or more servings of fruits and vegetables each day.  Try wheat bread, brown rice and whole grain pasta (instead of white bread, rice, and pasta).  Get enough calcium and vitamin D. Check the label on foods and aim for 100% of the RDA (recommended daily allowance).  Lifestyle  Exercise at least 150 minutes each week  (30 minutes a day, 5 days a week).  Do muscle strengthening activities 2 days a week. These help control your weight and prevent disease.  No smoking.  Wear sunscreen to prevent skin cancer.  Have a dental exam and cleaning every 6 months.  Yearly exams  See your health care team every year to talk about:  Any changes in your health.  Any medicines your care team has prescribed.  Preventive care, family planning, and ways to prevent chronic diseases.  Shots (vaccines)   HPV shots (up to age 26), if you've never had them before.  Hepatitis B shots (up to age 59), if you've never had them before.  COVID-19 shot: Get this shot when it's due.  Flu shot: Get a flu shot every year.  Tetanus shot: Get a tetanus shot every 10 years.  Pneumococcal, hepatitis A, and RSV shots: Ask your care team if you need these based on your risk.  Shingles shot (for age 50 and up)  General health tests  Diabetes screening:  Starting at age 35, Get screened for diabetes at least every 3 years.  If you are younger than age 35, ask your care team if you should be screened for diabetes.  Cholesterol test: At age 39, start having a cholesterol test every 5 years, or more often if advised.  Bone density scan (DEXA): At age 50, ask your care team if you should have this scan for osteoporosis (brittle bones).  Hepatitis C: Get tested at least once in your life.  STIs (sexually  transmitted infections)  Before age 24: Ask your care team if you should be screened for STIs.  After age 24: Get screened for STIs if you're at risk. You are at risk for STIs (including HIV) if:  You are sexually active with more than one person.  You don't use condoms every time.  You or a partner was diagnosed with a sexually transmitted infection.  If you are at risk for HIV, ask about PrEP medicine to prevent HIV.  Get tested for HIV at least once in your life, whether you are at risk for HIV or not.  Cancer screening tests  Cervical cancer screening: If you have a cervix, begin getting regular cervical cancer screening tests starting at age 21.  Breast cancer scan (mammogram): If you've ever had breasts, begin having regular mammograms starting at age 40. This is a scan to check for breast cancer.  Colon cancer screening: It is important to start screening for colon cancer at age 45.  Have a colonoscopy test every 10 years (or more often if you're at risk) Or, ask your provider about stool tests like a FIT test every year or Cologuard test every 3 years.  To learn more about your testing options, visit:   .  For help making a decision, visit:   https://bit.ly/pd34426.  Prostate cancer screening test: If you have a prostate, ask your care team if a prostate cancer screening test (PSA) at age 55 is right for you.  Lung cancer screening: If you are a current or former smoker ages 50 to 80, ask your care team if ongoing lung cancer screenings are right for you.  For informational purposes only. Not to replace the advice of your health care provider. Copyright   2023 [employer]. All rights reserved. Clinically reviewed by the [facility] Transitions Program. [employer] 377533 - REV 01/24.

## (undated) DEVICE — SOL WATER IRRIG 1000ML BOTTLE 2F7114

## (undated) DEVICE — TUBING SUCTION 6"X3/16" N56A

## (undated) DEVICE — KIT ENDO TURNOVER/PROCEDURE CARRY-ON 101822